# Patient Record
Sex: FEMALE | Race: WHITE | NOT HISPANIC OR LATINO | Employment: UNEMPLOYED | URBAN - NONMETROPOLITAN AREA
[De-identification: names, ages, dates, MRNs, and addresses within clinical notes are randomized per-mention and may not be internally consistent; named-entity substitution may affect disease eponyms.]

---

## 2017-02-08 ENCOUNTER — OFFICE VISIT (OUTPATIENT)
Dept: GASTROENTEROLOGY | Facility: CLINIC | Age: 52
End: 2017-02-08

## 2017-02-08 VITALS
DIASTOLIC BLOOD PRESSURE: 95 MMHG | TEMPERATURE: 98.4 F | BODY MASS INDEX: 36.7 KG/M2 | HEIGHT: 64 IN | WEIGHT: 215 LBS | RESPIRATION RATE: 15 BRPM | HEART RATE: 97 BPM | SYSTOLIC BLOOD PRESSURE: 146 MMHG

## 2017-02-08 DIAGNOSIS — K62.5 BRIGHT RED BLOOD PER RECTUM: ICD-10-CM

## 2017-02-08 DIAGNOSIS — R13.14 PHARYNGOESOPHAGEAL DYSPHAGIA: Chronic | ICD-10-CM

## 2017-02-08 DIAGNOSIS — R12 HEARTBURN: Primary | Chronic | ICD-10-CM

## 2017-02-08 DIAGNOSIS — K59.00 CONSTIPATION, UNSPECIFIED CONSTIPATION TYPE: ICD-10-CM

## 2017-02-08 DIAGNOSIS — R10.32 LEFT LOWER QUADRANT PAIN: ICD-10-CM

## 2017-02-08 DIAGNOSIS — R19.7 DIARRHEA, UNSPECIFIED TYPE: Chronic | ICD-10-CM

## 2017-02-08 PROCEDURE — 99214 OFFICE O/P EST MOD 30 MIN: CPT | Performed by: NURSE PRACTITIONER

## 2017-02-08 RX ORDER — ROPINIROLE 0.25 MG/1
0.25 TABLET, FILM COATED ORAL NIGHTLY
COMMUNITY

## 2017-02-08 NOTE — PATIENT INSTRUCTIONS
1. Antireflux measures: Avoid fried, fatty foods, alcohol, chocolate, coffee, tea,  soft drinks, peppermint and spearmint, spicy foods, tomatoes and tomato based foods, onion based foods, and smoking. Other antireflux measures include weight reduction if overweight, avoiding tight clothing around the abdomen, elevating the head of the bed 6 inches with blocks under the head board, and don't drink or eat before going to bed and avoid lying down immediately after meals.  2. Continue Omeprazole 40 mg 1 po daily in the am 30 minutes before breakfast.  3. Recommended to take Levothyroxine first in the am, wait 30 minutes, take Omeprazole, wait 30 minutes, then eat breakfast and take other medications.  4. Continue Welchol tablets for diarrhea. Avoid constipation.  5. Follow up colonoscopy in 5 years.  6. Patient declines EGD at this time.  7. Follow up: The patient wants to call back

## 2017-02-08 NOTE — PROGRESS NOTES
"[address] [place] [address] [address] [place] [address]    (H) [phone]  (W)     Chief Complaint   Patient presents with   • Follow-up     The patient is here for follow up. She states she is feeling much better after completing treatment for possible diverticulitis.The patient states she completed Cipro and Flagyl and her left lower quadrant abdominal pain resolved. She states she has not had any further pain.     There is a long-standing history of diarrhea. There is a history of cholecystectomy in the past. She has been taking Welchol tablets and this controls her symptoms. She states she may have 1-2 soft stools daily. She denies bright red blood per rectum or melena. The rectal bleeding resolved after completing antibiotics.    The patient states her heartburn is well controlled with Omeprazole 40 mg daily. She denies breakthrough symptoms. There is no nausea or vomiting. The patient states she has intermittent dysphagia symptoms, but this is well controlled if she chews her food well. Patient had been scheduled for EGD in November 2016, but cancelled as she was \"feeling better\".    Abdominal Pain   This is a recurrent problem. The current episode started more than 1 month ago. Episode frequency: 1 episode. The problem has been resolved. The pain is located in the LLQ. Associated symptoms include arthralgias, diarrhea and hematochezia. Pertinent negatives include no constipation, dysuria, fever, headaches, hematuria, melena, myalgias, nausea, vomiting or weight loss. She has tried antibiotics for the symptoms. The treatment provided significant relief. Prior diagnostic workup includes lower endoscopy. Her past medical history is significant for GERD.   Diarrhea    This is a chronic problem. The current episode started more than 1 year ago (starting at age 17, worsened in 2006 after gallbladder removal). The problem occurs less than 2 times per day. The problem has been unchanged. Diarrhea characteristics: soft. The patient states " that diarrhea does not awaken her from sleep. Associated symptoms include arthralgias. Pertinent negatives include no abdominal pain, chills, coughing, fever, headaches, myalgias, vomiting or weight loss. Nothing aggravates the symptoms. There are no known risk factors. Treatments tried: Welchol. The treatment provided significant relief.   Constipation   This is a recurrent problem. The current episode started more than 1 month ago (September 26, 2016). The problem has been resolved since onset. Associated symptoms include diarrhea and hematochezia. Pertinent negatives include no abdominal pain, fever, melena, nausea, vomiting or weight loss.   Rectal Bleeding   This is a recurrent problem. The current episode started more than 1 month ago (9/26/2016). The problem has been resolved. Associated symptoms include arthralgias, fatigue and joint swelling. Pertinent negatives include no abdominal pain, chest pain, chills, coughing, fever, headaches, myalgias, nausea, rash, vertigo or vomiting. Treatments tried: antibiotics. The treatment provided moderate relief.   Heartburn   She complains of heartburn. She reports no abdominal pain, no chest pain, no coughing or no nausea. This is a chronic problem. The current episode started more than 1 year ago. The problem occurs occasionally. The problem has been unchanged. The heartburn duration is an hour. The heartburn is located in the substernum. The heartburn is of mild intensity. The heartburn does not wake her from sleep. The heartburn does not limit her activity. The heartburn doesn't change with position. Nothing aggravates the symptoms. Associated symptoms include fatigue. Pertinent negatives include no anemia, melena or weight loss. There are no known risk factors. She has tried a PPI for the symptoms. The treatment provided significant relief. Past procedures include an EGD (2012 in [place]).   Difficulty Swallowing   This is a recurrent problem. The current episode  started more than 1 year ago (over 2 years). The problem occurs intermittently. The problem has been unchanged. Associated symptoms include arthralgias, fatigue and joint swelling. Pertinent negatives include no abdominal pain, chest pain, chills, coughing, fever, headaches, myalgias, nausea, rash, vertigo or vomiting. The symptoms are aggravated by eating. She has tried nothing for the symptoms.     Review of Systems   Constitutional: Positive for fatigue. Negative for appetite change, chills, fever, unexpected weight change and weight loss.   HENT: Negative for mouth sores, nosebleeds and trouble swallowing.    Eyes: Negative for discharge and redness.   Respiratory: Negative for apnea, cough and shortness of breath.    Cardiovascular: Negative for chest pain, palpitations and leg swelling.   Gastrointestinal: Positive for diarrhea, heartburn and hematochezia. Negative for abdominal distention, abdominal pain, anal bleeding, blood in stool, constipation, melena, nausea and vomiting.   Endocrine: Positive for cold intolerance and heat intolerance. Negative for polydipsia.   Genitourinary: Negative for dysuria, hematuria and urgency.   Musculoskeletal: Positive for arthralgias and joint swelling. Negative for myalgias.   Skin: Negative for rash.   Allergic/Immunologic: Negative for food allergies and immunocompromised state.   Neurological: Negative for dizziness, vertigo, seizures, syncope and headaches.   Hematological: Negative for adenopathy. Does not bruise/bleed easily.   Psychiatric/Behavioral: Negative for dysphoric mood. The patient is nervous/anxious. The patient is not hyperactive.      Patient Active Problem List   Diagnosis   • Migraine   • History of anxiety disorder   • History of depression   • History of hypertension   • Lower abdominal pain   • Constipation   • Diarrhea   • Bright red blood per rectum   • Heartburn   • Pharyngoesophageal dysphagia   • Left lower quadrant pain     Past Medical  History   Diagnosis Date   • Anxiety    • Asthma    • Back pain    • Colon polyp 2016   • Depression    • Diabetes mellitus    • Fibromyalgia    • Hypertension    • Hypothyroidism    • Migraine    • Osteoarthritis    • Sinus problem    • Snores      Past Surgical History   Procedure Laterality Date   • Cholecystectomy  2006     gallstones   •  section  ,    • Hysterectomy  2008     Partial hysty   • Ovary surgery       removal of ovary   • Colonoscopy     • Colonoscopy     • Colonoscopy  2016   • Upper gastrointestinal endoscopy     • Upper gastrointestinal endoscopy       Family History   Problem Relation Age of Onset   • Hypertension Mother    • Stroke Mother    • Mental illness Mother    • Diabetes Father    • Hypertension Father    • Cancer Other    • Stomach cancer Maternal Grandmother    • Colon cancer Neg Hx    • Ulcerative colitis Neg Hx    • Esophageal cancer Neg Hx    • Liver cancer Neg Hx    • Liver disease Neg Hx      Social History   Substance Use Topics   • Smoking status: Never Smoker   • Smokeless tobacco: Never Used   • Alcohol use No      Comment: Former use; quit in        Current Outpatient Prescriptions:   •  amitriptyline (ELAVIL) 50 MG tablet, Take 1 tablet by mouth every night., Disp: 30 tablet, Rfl: 11  •  buPROPion XL (WELLBUTRIN XL) 150 MG 24 hr tablet, Take 3 tabs every day, Disp: , Rfl: 1  •  cholecalciferol (VITAMIN D3) 35308 UNITS capsule, Take 50,000 Units by mouth 1 (One) Time Per Week., Disp: , Rfl:   •  dicyclomine (BENTYL) 10 MG capsule, TK 1 C PO TID, Disp: , Rfl: 5  •  DULoxetine (CYMBALTA) 60 MG capsule, Take 60 mg by mouth daily., Disp: , Rfl:   •  glipiZIDE (GLUCOTROL) 5 MG ER tablet, TK 1 T PO QD WITH KEN, Disp: , Rfl: 3  •  ibuprofen (ADVIL,MOTRIN) 600 MG tablet, Take 600 mg by mouth every 8 (eight) hours as needed., Disp: , Rfl:   •  levothyroxine (SYNTHROID, LEVOTHROID) 88  "MCG tablet, Take 88 mcg by mouth daily., Disp: , Rfl:   •  lisinopril-hydrochlorothiazide (PRINZIDE,ZESTORETIC) 20-12.5 MG per tablet, Take 1 tablet by mouth daily., Disp: , Rfl:   •  metFORMIN (GLUCOPHAGE) 500 MG tablet, Take 500 mg by mouth 2 (two) times a day., Disp: , Rfl:   •  montelukast (SINGULAIR) 10 MG tablet, Take 10 mg by mouth daily., Disp: , Rfl:   •  omeprazole (PriLOSEC) 40 MG capsule, TK ONE C PO QD BEFORE A MEAL, Disp: , Rfl: 1  •  rOPINIRole (REQUIP) 0.25 MG tablet, Take 0.25 mg by mouth Every Night. Take 1 hour before bedtime., Disp: , Rfl:   •  traMADol (ULTRAM) 50 MG tablet, TK 1 T PO HS, Disp: , Rfl: 1  •  WELCHOL 625 MG tablet, TK 1 T PO TID WITH A MEAL AND LIQUID, Disp: , Rfl: 5  •  Emollient (CERAVE) lotion, USE UTD ON PACKAGE, Disp: , Rfl: 0     Allergies   Allergen Reactions   • Ceftin [Cefuroxime] Hives   • Erythromycin Diarrhea and GI Intolerance   • Keflex [Cephalexin] Hives     Visit Vitals   • /95   • Pulse 97   • Temp 98.4 °F (36.9 °C)   • Resp 15   • Ht 64\" (162.6 cm)   • Wt 215 lb (97.5 kg)   • LMP 10/11/2008 (Approximate)   • BMI 36.9 kg/m2     Physical Exam   Constitutional: She is oriented to person, place, and time. She appears well-developed and well-nourished. No distress.   HENT:   Head: Normocephalic and atraumatic.   Right Ear: Hearing and external ear normal.   Left Ear: Hearing and external ear normal.   Nose: Nose normal.   Mouth/Throat: Oropharynx is clear and moist and mucous membranes are normal. Mucous membranes are not pale, not dry and not cyanotic. No oral lesions. No oropharyngeal exudate.   Eyes: Conjunctivae and EOM are normal. Right eye exhibits no discharge. Left eye exhibits no discharge.   Neck: Trachea normal. Neck supple. No JVD present. No edema present. No thyroid mass and no thyromegaly present.   Cardiovascular: Normal rate, regular rhythm, S2 normal and normal heart sounds.  Exam reveals no gallop, no S3 and no friction rub.    No murmur " heard.  Pulmonary/Chest: Effort normal and breath sounds normal. No respiratory distress. She exhibits no tenderness.   Abdominal: Normal appearance and bowel sounds are normal. She exhibits no distension, no ascites and no mass. There is no splenomegaly or hepatomegaly. There is no tenderness. There is no rigidity, no rebound and no guarding. No hernia.       Vascular Status -  Her exam exhibits no right foot edema. Her exam exhibits no left foot edema.  Lymphadenopathy:     She has no cervical adenopathy.        Left: No supraclavicular adenopathy present.   Neurological: She is alert and oriented to person, place, and time. She has normal strength. No cranial nerve deficit or sensory deficit.   Skin: No rash noted. She is not diaphoretic. No cyanosis. No pallor. Nails show no clubbing.   Psychiatric: She has a normal mood and affect.     Laboratory Tests:    Upon review of records:     Dated 4/28/2016 glucose 156 potassium 4.2 sodium 134 BUN 15 creatinine 0.79 albumin 4. 8:00 phosphatase 95 ALT 35 AST 24 total bilirubin 0.5 calcium 10.1 CO2 27 chloride 96 WBC 9.9 hemoglobin 14.3 hematocrit 43.5 platelet count 221 MCV 88.1 TSH 1.92 hemoglobin A1c 8.8    Procedures:    Colonoscopy dated 11/16/2016 reveals scant diverticular change in the left colon.  Small diminutive colon polyps.  Internal and small external hemorrhoids.  No endoscopic evidence of ileitis or colitis was seen.  Random biopsies were obtained from the colon upon withdrawal of the scope.  Small bowel terminal ileum biopsy reveals preserved villous architecture with no significant histopathologic change.  No parasites noted.  No villous blunting or increased intraepithelial lymphocytes suggestive of celiac disease.  Random colon biopsies revealed colonic mucosa with no significant histopathologic change.  No histologic features of acute colitis, chronic colitis, collagenous colitis or lymphocytic colitis identified.  Rectal polyp biopsy reveals  hyperplastic polyp.    Meryl was seen today for follow-up.    Diagnoses and all orders for this visit:    Heartburn  Comments:  Controlled with Omeprazole daily.    Pharyngoesophageal dysphagia  Comments:  Differentials include Schatzki's ring, esophageal dysmotility, esophagitis.    Diarrhea, unspecified type  Comments:  Likely secondary to bile acid diarrhea.    Left lower quadrant pain  Comments:  Resolved.    Constipation, unspecified constipation type  Comments:  Resolved.    Bright red blood per rectum  Comments:  Resolved. Likely secondary to internal hemorrhoids.           Plan   Patient Instructions   1. Antireflux measures: Avoid fried, fatty foods, alcohol, chocolate, coffee, tea,  soft drinks, peppermint and spearmint, spicy foods, tomatoes and tomato based foods, onion based foods, and smoking. Other antireflux measures include weight reduction if overweight, avoiding tight clothing around the abdomen, elevating the head of the bed 6 inches with blocks under the head board, and don't drink or eat before going to bed and avoid lying down immediately after meals.  2. Continue Omeprazole 40 mg 1 po daily in the am 30 minutes before breakfast.  3. Recommended to take Levothyroxine first in the am, wait 30 minutes, take Omeprazole, wait 30 minutes, then eat breakfast and take other medications.  4. Continue Welchol tablets for diarrhea. Avoid constipation.  5. Follow up colonoscopy in 5 years.  6. Patient declines EGD at this time.  7. Follow up: The patient wants to call back    Patient Care Team:  NORY Forte as PCP - General    NORY Millan

## 2017-07-18 ENCOUNTER — OFFICE VISIT (OUTPATIENT)
Dept: NEUROLOGY | Facility: CLINIC | Age: 52
End: 2017-07-18

## 2017-07-18 VITALS
BODY MASS INDEX: 36.37 KG/M2 | HEIGHT: 64 IN | DIASTOLIC BLOOD PRESSURE: 76 MMHG | HEART RATE: 96 BPM | SYSTOLIC BLOOD PRESSURE: 118 MMHG | WEIGHT: 213 LBS

## 2017-07-18 DIAGNOSIS — G43.009 MIGRAINE WITHOUT AURA AND WITHOUT STATUS MIGRAINOSUS, NOT INTRACTABLE: Primary | ICD-10-CM

## 2017-07-18 PROCEDURE — 99212 OFFICE O/P EST SF 10 MIN: CPT | Performed by: PSYCHIATRY & NEUROLOGY

## 2017-07-18 RX ORDER — AMITRIPTYLINE HYDROCHLORIDE 50 MG/1
50 TABLET, FILM COATED ORAL NIGHTLY
Qty: 30 TABLET | Refills: 11 | Status: SHIPPED | OUTPATIENT
Start: 2017-07-18 | End: 2018-07-18 | Stop reason: SDUPTHER

## 2017-07-18 NOTE — PROGRESS NOTES
"Subjective:     Patient ID: Meryl Perla is a 51 y.o. female.    History of Present Illness     51 y.o.  woman with migraines returns in follow up.  Last visit on 7/19/16 continued Elavil 50 mg qhs.    HA frequency a couple times a month.  Sensitive to light, and noise, feels overstimulated.  Top of head pressure and vibrations with rare throbbing.  Moderate to severe intensity.      The following portions of the patient's history were reviewed and updated as appropriate: allergies, current medications, past medical history, past surgical history and problem list.    Review of Systems   Constitutional: Negative for activity change, fatigue and unexpected weight change.   HENT: Negative for tinnitus and trouble swallowing.    Eyes: Negative for photophobia and visual disturbance.   Respiratory: Negative for apnea, cough and choking.    Cardiovascular: Negative for leg swelling.   Gastrointestinal: Negative for nausea and vomiting.   Endocrine: Negative for cold intolerance and heat intolerance.   Genitourinary: Negative for difficulty urinating, frequency, menstrual problem and urgency.   Musculoskeletal: Negative for back pain, gait problem, myalgias and neck pain.   Skin: Negative for color change and rash.   Allergic/Immunologic: Negative for immunocompromised state.   Neurological: Positive for headaches. Negative for dizziness, tremors, seizures, syncope, facial asymmetry, speech difficulty, weakness, light-headedness and numbness.   Hematological: Negative for adenopathy. Does not bruise/bleed easily.   Psychiatric/Behavioral: Negative for behavioral problems, confusion, decreased concentration, hallucinations and sleep disturbance.        Objective:  Vitals:    07/18/17 1300   BP: 118/76   Pulse: 96   Weight: 213 lb (96.6 kg)   Height: 64\" (162.6 cm)       Neurologic Exam     Mental Status   Attention: normal. Concentration: normal.   Level of consciousness: alert  Knowledge: good and consistent with " education.   Normal comprehension.     Cranial Nerves     CN II   Visual fields full to confrontation.   Visual acuity: normal  Right visual field deficit: none  Left visual field deficit: none     CN III, IV, VI   Nystagmus: none   Diplopia: none  Ophthalmoparesis: none  Upgaze: normal  Downgaze: normal  Conjugate gaze: present    CN V   Facial sensation intact.   Right corneal reflex: normal  Left corneal reflex: normal    CN VII   Right facial weakness: none  Left facial weakness: none    CN VIII   Hearing: intact    CN IX, X   Palate: symmetric  Right gag reflex: normal  Left gag reflex: normal    CN XI   Right sternocleidomastoid strength: normal  Left sternocleidomastoid strength: normal    CN XII   Tongue: not atrophic  Fasciculations: absent  Tongue deviation: none    Motor Exam   Muscle bulk: normal  Overall muscle tone: normal  Right arm tone: normal  Left arm tone: normal  Right leg tone: normal  Left leg tone: normal    Sensory Exam   Light touch normal.     Gait, Coordination, and Reflexes     Tremor   Resting tremor: absent  Intention tremor: absent  Action tremor: absent    Reflexes   Reflexes 2+ except as noted.       Physical Exam   Constitutional: She appears well-developed and well-nourished.   Nursing note and vitals reviewed.      Assessment/Plan:       Problems Addressed this Visit        Cardiovascular and Mediastinum    Migraine - Primary     Headaches are unchanged.  Continue current treatment regimen.     Elavil 50 mg qhs             Relevant Medications    amitriptyline (ELAVIL) 50 MG tablet

## 2018-07-18 ENCOUNTER — OFFICE VISIT (OUTPATIENT)
Dept: NEUROLOGY | Facility: CLINIC | Age: 53
End: 2018-07-18

## 2018-07-18 VITALS
WEIGHT: 208 LBS | OXYGEN SATURATION: 99 % | HEIGHT: 64 IN | SYSTOLIC BLOOD PRESSURE: 116 MMHG | RESPIRATION RATE: 18 BRPM | BODY MASS INDEX: 35.51 KG/M2 | HEART RATE: 97 BPM | DIASTOLIC BLOOD PRESSURE: 78 MMHG

## 2018-07-18 DIAGNOSIS — G43.009 MIGRAINE WITHOUT AURA AND WITHOUT STATUS MIGRAINOSUS, NOT INTRACTABLE: Primary | ICD-10-CM

## 2018-07-18 DIAGNOSIS — G25.81 RESTLESS LEGS SYNDROME (RLS): ICD-10-CM

## 2018-07-18 PROCEDURE — 99213 OFFICE O/P EST LOW 20 MIN: CPT | Performed by: PSYCHIATRY & NEUROLOGY

## 2018-07-18 RX ORDER — ARIPIPRAZOLE 5 MG/1
5 TABLET ORAL DAILY
COMMUNITY

## 2018-07-18 RX ORDER — AMITRIPTYLINE HYDROCHLORIDE 50 MG/1
50 TABLET, FILM COATED ORAL NIGHTLY
Qty: 30 TABLET | Refills: 11 | Status: SHIPPED | OUTPATIENT
Start: 2018-07-18 | End: 2019-10-22 | Stop reason: SDUPTHER

## 2018-07-18 NOTE — PROGRESS NOTES
"Subjective:     Patient ID: Meryl Perla is a 52 y.o. female.  Chief Complaint   Patient presents with   • Migraine       History of Present Illness     52 y.o.  woman with migraines and RLS returns in follow up.  Last visit on 7/18/17 continued Elavil 50 mg qhs.    Migraines     HA frequency increased to twice a week.  Sharp shooting pains lasting for a few seconds.   Sensitive to light, and noise, feels overstimulated.  Top of head pressure and vibrations with rare throbbing.  Moderate to severe intensity.    Tolerating Elavil, Cymbalta.     RLS    Controlled on Requip 0.75 mg qhs     The following portions of the patient's history were reviewed and updated as appropriate: allergies, current medications, past medical history, past surgical history and problem list.    Review of Systems   Constitutional: Negative for activity change, fatigue and unexpected weight change.   HENT: Negative for trouble swallowing.    Eyes: Negative for visual disturbance.   Respiratory: Negative for apnea and choking.    Cardiovascular: Negative for leg swelling.   Endocrine: Negative for cold intolerance and heat intolerance.   Genitourinary: Negative for difficulty urinating, frequency, menstrual problem and urgency.   Musculoskeletal: Negative for gait problem and myalgias.   Skin: Negative for color change and rash.   Allergic/Immunologic: Negative for immunocompromised state.   Neurological: Positive for headaches. Negative for tremors, syncope, facial asymmetry, speech difficulty and light-headedness.   Hematological: Negative for adenopathy. Does not bruise/bleed easily.   Psychiatric/Behavioral: Negative for behavioral problems, confusion, decreased concentration, hallucinations and sleep disturbance.        Objective:  Vitals:    07/18/18 1325   BP: 116/78   BP Location: Right arm   Patient Position: Sitting   Cuff Size: Adult   Pulse: 97   Resp: 18   SpO2: 99%   Weight: 94.3 kg (208 lb)   Height: 162.6 cm (64\") "       Neurologic Exam     Mental Status   Attention: normal. Concentration: normal.   Level of consciousness: alert  Knowledge: good and consistent with education.   Normal comprehension.     Cranial Nerves     CN II   Visual fields full to confrontation.   Visual acuity: normal  Right visual field deficit: none  Left visual field deficit: none     CN III, IV, VI   Nystagmus: none   Diplopia: none  Ophthalmoparesis: none  Upgaze: normal  Downgaze: normal  Conjugate gaze: present    CN V   Facial sensation intact.   Right corneal reflex: normal  Left corneal reflex: normal    CN VII   Right facial weakness: none  Left facial weakness: none    CN VIII   Hearing: intact    CN IX, X   Palate: symmetric  Right gag reflex: normal  Left gag reflex: normal    CN XI   Right sternocleidomastoid strength: normal  Left sternocleidomastoid strength: normal    CN XII   Tongue: not atrophic  Fasciculations: absent  Tongue deviation: none    Motor Exam   Muscle bulk: normal  Overall muscle tone: normal  Right arm tone: normal  Left arm tone: normal  Right leg tone: normal  Left leg tone: normal    Sensory Exam   Light touch normal.     Gait, Coordination, and Reflexes     Tremor   Resting tremor: absent  Intention tremor: absent  Action tremor: absent    Reflexes   Reflexes 2+ except as noted.       Physical Exam   Constitutional: She appears well-developed and well-nourished.   Nursing note and vitals reviewed.      Assessment/Plan:       Problems Addressed this Visit        Cardiovascular and Mediastinum    Migraine - Primary     Headaches are unchanged.  Continue current treatment regimen.             Relevant Medications    amitriptyline (ELAVIL) 50 MG tablet       Other    Restless legs syndrome (RLS)     Controlled on Requip

## 2019-10-22 RX ORDER — AMITRIPTYLINE HYDROCHLORIDE 50 MG/1
50 TABLET, FILM COATED ORAL NIGHTLY
Qty: 30 TABLET | Refills: 0 | Status: SHIPPED | OUTPATIENT
Start: 2019-10-22 | End: 2019-11-21 | Stop reason: SDUPTHER

## 2019-10-22 NOTE — TELEPHONE ENCOUNTER
Called pt and left vm for pt to please give office a call to schedule fu appt so that we may continue refilling medications. Thanks.

## 2019-11-21 RX ORDER — AMITRIPTYLINE HYDROCHLORIDE 50 MG/1
50 TABLET, FILM COATED ORAL NIGHTLY
Qty: 30 TABLET | Refills: 11 | Status: SHIPPED | OUTPATIENT
Start: 2019-11-21 | End: 2020-11-20

## 2021-12-02 ENCOUNTER — PRIOR AUTHORIZATION (OUTPATIENT)
Dept: ENDOCRINOLOGY | Age: 56
End: 2021-12-02

## 2023-06-13 ENCOUNTER — LAB (OUTPATIENT)
Dept: LAB | Facility: HOSPITAL | Age: 58
End: 2023-06-13
Payer: COMMERCIAL

## 2023-06-13 ENCOUNTER — OFFICE VISIT (OUTPATIENT)
Dept: GASTROENTEROLOGY | Facility: CLINIC | Age: 58
End: 2023-06-13
Payer: COMMERCIAL

## 2023-06-13 VITALS
TEMPERATURE: 98.1 F | SYSTOLIC BLOOD PRESSURE: 112 MMHG | HEIGHT: 64 IN | BODY MASS INDEX: 31.92 KG/M2 | HEART RATE: 87 BPM | WEIGHT: 187 LBS | OXYGEN SATURATION: 100 % | DIASTOLIC BLOOD PRESSURE: 78 MMHG

## 2023-06-13 DIAGNOSIS — K21.9 GASTROESOPHAGEAL REFLUX DISEASE, UNSPECIFIED WHETHER ESOPHAGITIS PRESENT: Chronic | ICD-10-CM

## 2023-06-13 DIAGNOSIS — R19.7 DIARRHEA, UNSPECIFIED TYPE: Chronic | ICD-10-CM

## 2023-06-13 DIAGNOSIS — D64.9 ANEMIA, UNSPECIFIED TYPE: ICD-10-CM

## 2023-06-13 DIAGNOSIS — K76.0 FATTY (CHANGE OF) LIVER, NOT ELSEWHERE CLASSIFIED: ICD-10-CM

## 2023-06-13 DIAGNOSIS — K76.0 FATTY (CHANGE OF) LIVER, NOT ELSEWHERE CLASSIFIED: Chronic | ICD-10-CM

## 2023-06-13 DIAGNOSIS — Z86.010 PERSONAL HISTORY OF COLONIC POLYPS: Chronic | ICD-10-CM

## 2023-06-13 DIAGNOSIS — D64.9 ANEMIA, UNSPECIFIED TYPE: Chronic | ICD-10-CM

## 2023-06-13 DIAGNOSIS — R79.89 ELEVATED LIVER FUNCTION TESTS: Primary | Chronic | ICD-10-CM

## 2023-06-13 DIAGNOSIS — E66.09 CLASS 1 OBESITY DUE TO EXCESS CALORIES WITH SERIOUS COMORBIDITY AND BODY MASS INDEX (BMI) OF 32.0 TO 32.9 IN ADULT: Chronic | ICD-10-CM

## 2023-06-13 DIAGNOSIS — R10.30 LOWER ABDOMINAL PAIN: Chronic | ICD-10-CM

## 2023-06-13 DIAGNOSIS — R79.89 ELEVATED LIVER FUNCTION TESTS: ICD-10-CM

## 2023-06-13 PROBLEM — E66.811 CLASS 1 OBESITY DUE TO EXCESS CALORIES WITH SERIOUS COMORBIDITY AND BODY MASS INDEX (BMI) OF 32.0 TO 32.9 IN ADULT: Status: ACTIVE | Noted: 2023-06-13

## 2023-06-13 PROBLEM — Z86.0100 PERSONAL HISTORY OF COLONIC POLYPS: Status: ACTIVE | Noted: 2023-06-13

## 2023-06-13 LAB
ALBUMIN SERPL-MCNC: 4.8 G/DL (ref 3.5–5.2)
ALBUMIN/GLOB SERPL: 1.7 G/DL
ALP SERPL-CCNC: 69 U/L (ref 39–117)
ALT SERPL W P-5'-P-CCNC: 45 U/L (ref 1–33)
ANION GAP SERPL CALCULATED.3IONS-SCNC: 13 MMOL/L (ref 5–15)
AST SERPL-CCNC: 36 U/L (ref 1–32)
BILIRUB SERPL-MCNC: 0.4 MG/DL (ref 0–1.2)
BUN SERPL-MCNC: 16 MG/DL (ref 6–20)
BUN/CREAT SERPL: 14.2 (ref 7–25)
CALCIUM SPEC-SCNC: 10.1 MG/DL (ref 8.6–10.5)
CERULOPLASMIN SERPL-MCNC: 25 MG/DL (ref 19–39)
CHLORIDE SERPL-SCNC: 103 MMOL/L (ref 98–107)
CO2 SERPL-SCNC: 23 MMOL/L (ref 22–29)
CREAT SERPL-MCNC: 1.13 MG/DL (ref 0.57–1)
DEPRECATED RDW RBC AUTO: 38.9 FL (ref 37–54)
EGFRCR SERPLBLD CKD-EPI 2021: 56.9 ML/MIN/1.73
ERYTHROCYTE [DISTWIDTH] IN BLOOD BY AUTOMATED COUNT: 12.2 % (ref 12.3–15.4)
FERRITIN SERPL-MCNC: 82 NG/ML (ref 13–150)
GLOBULIN UR ELPH-MCNC: 2.8 GM/DL
GLUCOSE SERPL-MCNC: 180 MG/DL (ref 65–99)
HBV SURFACE AG SERPL QL IA: NORMAL
HCT VFR BLD AUTO: 40.2 % (ref 34–46.6)
HCV AB SER DONR QL: NORMAL
HGB BLD-MCNC: 13.6 G/DL (ref 12–15.9)
INR PPP: 1.02 (ref 0.9–1.1)
IRON 24H UR-MRATE: 102 MCG/DL (ref 37–145)
IRON SATN MFR SERPL: 20 % (ref 20–50)
MCH RBC QN AUTO: 29.4 PG (ref 26.6–33)
MCHC RBC AUTO-ENTMCNC: 33.8 G/DL (ref 31.5–35.7)
MCV RBC AUTO: 87 FL (ref 79–97)
PLATELET # BLD AUTO: 141 10*3/MM3 (ref 140–450)
PMV BLD AUTO: 11.1 FL (ref 6–12)
POTASSIUM SERPL-SCNC: 4.4 MMOL/L (ref 3.5–5.2)
PROT SERPL-MCNC: 7.6 G/DL (ref 6–8.5)
PROTHROMBIN TIME: 13.9 SECONDS (ref 12.3–15.1)
RBC # BLD AUTO: 4.62 10*6/MM3 (ref 3.77–5.28)
SODIUM SERPL-SCNC: 139 MMOL/L (ref 136–145)
TIBC SERPL-MCNC: 520 MCG/DL (ref 298–536)
TRANSFERRIN SERPL-MCNC: 349 MG/DL (ref 200–360)
WBC NRBC COR # BLD: 6.91 10*3/MM3 (ref 3.4–10.8)

## 2023-06-13 PROCEDURE — 82977 ASSAY OF GGT: CPT

## 2023-06-13 PROCEDURE — 85610 PROTHROMBIN TIME: CPT

## 2023-06-13 PROCEDURE — 99204 OFFICE O/P NEW MOD 45 MIN: CPT | Performed by: NURSE PRACTITIONER

## 2023-06-13 PROCEDURE — 82390 ASSAY OF CERULOPLASMIN: CPT

## 2023-06-13 PROCEDURE — 82947 ASSAY GLUCOSE BLOOD QUANT: CPT

## 2023-06-13 PROCEDURE — 82247 BILIRUBIN TOTAL: CPT

## 2023-06-13 PROCEDURE — 86803 HEPATITIS C AB TEST: CPT

## 2023-06-13 PROCEDURE — 82465 ASSAY BLD/SERUM CHOLESTEROL: CPT

## 2023-06-13 PROCEDURE — 85027 COMPLETE CBC AUTOMATED: CPT

## 2023-06-13 PROCEDURE — 86704 HEP B CORE ANTIBODY TOTAL: CPT

## 2023-06-13 PROCEDURE — 36415 COLL VENOUS BLD VENIPUNCTURE: CPT

## 2023-06-13 PROCEDURE — 86015 ACTIN ANTIBODY EACH: CPT

## 2023-06-13 PROCEDURE — 86317 IMMUNOASSAY INFECTIOUS AGENT: CPT

## 2023-06-13 PROCEDURE — 87340 HEPATITIS B SURFACE AG IA: CPT

## 2023-06-13 PROCEDURE — 86381 MITOCHONDRIAL ANTIBODY EACH: CPT

## 2023-06-13 PROCEDURE — 83010 ASSAY OF HAPTOGLOBIN QUANT: CPT

## 2023-06-13 PROCEDURE — 83540 ASSAY OF IRON: CPT

## 2023-06-13 PROCEDURE — 84466 ASSAY OF TRANSFERRIN: CPT

## 2023-06-13 PROCEDURE — 86038 ANTINUCLEAR ANTIBODIES: CPT

## 2023-06-13 PROCEDURE — 83883 ASSAY NEPHELOMETRY NOT SPEC: CPT

## 2023-06-13 PROCEDURE — 82172 ASSAY OF APOLIPOPROTEIN: CPT

## 2023-06-13 PROCEDURE — 84478 ASSAY OF TRIGLYCERIDES: CPT

## 2023-06-13 PROCEDURE — 82728 ASSAY OF FERRITIN: CPT

## 2023-06-13 PROCEDURE — 86708 HEPATITIS A ANTIBODY: CPT

## 2023-06-13 PROCEDURE — 80053 COMPREHEN METABOLIC PANEL: CPT

## 2023-06-13 RX ORDER — PREGABALIN 100 MG/1
CAPSULE ORAL
COMMUNITY

## 2023-06-13 RX ORDER — DULAGLUTIDE 3 MG/.5ML
INJECTION, SOLUTION SUBCUTANEOUS
COMMUNITY
Start: 2023-06-09

## 2023-06-13 RX ORDER — SODIUM CHLORIDE 9 MG/ML
70 INJECTION, SOLUTION INTRAVENOUS CONTINUOUS PRN
OUTPATIENT
Start: 2023-06-13

## 2023-06-13 NOTE — PROGRESS NOTES
"     New Patient Consult      Date: 2023   Patient Name: Meryl Perla  MRN: [id number]  : 1965     Primary Care Provider: Isabela Sen APRN    Chief Complaint   Patient presents with    high LFT     History of Present Illness: Meryl Perla is a 57 y.o. female who is here today to establish care with gastroenterology for elevated liver enzymes.     She has a history of elevated liver enzymes for the past 5-6 years. She denies personal or family history of liver disease. There is no history of IVDA, alcohol use, tattoos or blood transfusions.     She has a history of anemia for the past year or so. There is no history of GI bleeding, denies hematemesis, melena or hematochezia. There is no history of hematuria or vaginal bleeding.     She has a history of reflux for many years that is reasonably controlled with Omeprazole 40 mg daily. No difficulty swallowing. Denies nausea or vomiting.     She has a history of \"bile acid diarrhea\" for many years that is reasonably controlled with Welchol tablet 1-3 times per day. She usually has 1-2 soft bowel movements per day, occasionally has 3. She has occasional abdominal pain associated with bowel movements if having diarrhea, maybe 1-2 times per week.     Her last colonoscopy was in 2022 by Dr. Montenegro in [place], [place]. Her last EGD was 7-8 years ago. Her grandmother had stomach cancer. No family history of colon cancer.    Subjective      Past Medical History:   Diagnosis Date    Anxiety 1982    Asthma 1993    Back pain 2013    Colon polyp 2016    Depression 1982    Diabetes mellitus 2015    Fibromyalgia     Hypertension     Hypothyroidism 2013    Migraine 2013    Osteoarthritis 2013    Sinus problem 1993    Snores      Past Surgical History:   Procedure Laterality Date     SECTION  ,     CHOLECYSTECTOMY  2006    gallstones    COLONOSCOPY      COLONOSCOPY      COLONOSCOPY  2016    HYSTERECTOMY  2008 "    Partial hysty    OVARY SURGERY  2010    removal of ovary    UPPER GASTROINTESTINAL ENDOSCOPY  2012    UPPER GASTROINTESTINAL ENDOSCOPY  2006     Family History   Problem Relation Age of Onset    Hypertension Mother     Stroke Mother     Mental illness Mother     Diabetes Father     Hypertension Father     Cancer Other     Stomach cancer Maternal Grandmother     Colon cancer Neg Hx     Ulcerative colitis Neg Hx     Esophageal cancer Neg Hx     Liver cancer Neg Hx     Liver disease Neg Hx      Social History     Socioeconomic History    Marital status:    Tobacco Use    Smoking status: Never    Smokeless tobacco: Never   Substance and Sexual Activity    Alcohol use: No     Comment: Former use; quit in 1989    Drug use: No    Sexual activity: Defer       Current Outpatient Medications:     ARIPiprazole (ABILIFY) 5 MG tablet, Take 1 tablet by mouth Daily., Disp: , Rfl:     buPROPion XL (WELLBUTRIN XL) 150 MG 24 hr tablet, Take 3 tabs every day, Disp: , Rfl: 1    cholecalciferol (VITAMIN D3) 24741 UNITS capsule, Take 1 capsule by mouth 1 (One) Time Per Week., Disp: , Rfl:     dicyclomine (BENTYL) 10 MG capsule, TK 1 C PO TID, Disp: , Rfl: 5    DULoxetine (CYMBALTA) 60 MG capsule, Take 1 capsule by mouth Daily., Disp: , Rfl:     Emollient (CERAVE) lotion, USE UTD ON PACKAGE, Disp: , Rfl: 0    glipiZIDE (GLUCOTROL) 5 MG ER tablet, TK 1 T PO QD WITH KEN, Disp: , Rfl: 3    ibuprofen (ADVIL,MOTRIN) 600 MG tablet, Take 1 tablet by mouth Every 8 (Eight) Hours As Needed., Disp: , Rfl:     levothyroxine (SYNTHROID, LEVOTHROID) 88 MCG tablet, Take 100 mcg by mouth Daily., Disp: , Rfl:     lisinopril-hydrochlorothiazide (PRINZIDE,ZESTORETIC) 20-12.5 MG per tablet, Take 1 tablet by mouth Daily., Disp: , Rfl:     metFORMIN (GLUCOPHAGE) 500 MG tablet, Take 2 tablets by mouth 2 (Two) Times a Day., Disp: , Rfl:     montelukast (SINGULAIR) 10 MG tablet, Take 1 tablet by mouth Daily., Disp: , Rfl:     omeprazole (PriLOSEC) 40 MG  capsule, TK ONE C PO QD BEFORE A MEAL, Disp: , Rfl: 1    pregabalin (LYRICA) 100 MG capsule, pregabalin 100 mg capsule  TAKE 1 CAPSULE BY MOUTH TWICE DAILY, Disp: , Rfl:     rOPINIRole (REQUIP) 0.25 MG tablet, Take 1 tablet by mouth Every Night. Take 1 hour before bedtime., Disp: , Rfl:     traMADol (ULTRAM) 50 MG tablet, TK 1 T PO HS, Disp: , Rfl: 1    Trulicity 3 MG/0.5ML solution pen-injector, , Disp: , Rfl:     WELCHOL 625 MG tablet, TK 1 T PO TID WITH A MEAL AND LIQUID, Disp: , Rfl: 5     Allergies   Allergen Reactions    Ceftin [Cefuroxime] Hives    Erythromycin Diarrhea and GI Intolerance    Keflex [Cephalexin] Hives     The following portions of the patient's history were reviewed and updated as appropriate: allergies, current medications, past family history, past medical history, past social history, past surgical history and problem list.    Objective     Physical Exam  Vitals and nursing note reviewed.   Constitutional:       General: She is not in acute distress.     Appearance: Normal appearance. She is well-developed.   HENT:      Head: Normocephalic and atraumatic.      Mouth/Throat:      Mouth: Mucous membranes are not pale, not dry and not cyanotic.   Eyes:      General: Lids are normal.   Neck:      Trachea: Trachea normal.   Cardiovascular:      Rate and Rhythm: Normal rate.   Pulmonary:      Effort: Pulmonary effort is normal. No respiratory distress.      Breath sounds: Normal breath sounds.   Abdominal:      General: Bowel sounds are normal.      Palpations: Abdomen is soft. There is no mass.      Tenderness: There is no abdominal tenderness.      Hernia: No hernia is present.   Skin:     General: Skin is warm and dry.   Neurological:      Mental Status: She is alert and oriented to person, place, and time.   Psychiatric:         Mood and Affect: Mood normal.         Speech: Speech normal.         Behavior: Behavior normal. Behavior is cooperative.       Vitals:    06/13/23 0904   BP: 112/78  "  BP Location: Left arm   Patient Position: Sitting   Cuff Size: Adult   Pulse: 87   Temp: 98.1 °F (36.7 °C)   SpO2: 100%   Weight: 84.8 kg (187 lb)   Height: 162.6 cm (64\")     Body mass index is 32.1 kg/m².     Results Review:   I have reviewed the patient's new clinical and imaging results.    No visits with results within 90 Day(s) from this visit.   Latest known visit with results is:   No results found for any previous visit.      Dated 4/7/2022 albumin 4.6 alkaline phosphatase 93 ALT 36 AST 46 total bilirubin <0.2 hemoglobin 10.4 hematocrit 34.0 platelet count 250, TSH 2.630, triglycerides 190, total cholesterol 177    Abdominal ultrasound limited 2/2/2023  Fatty infiltration of the liver.   The gallbladder is not identified.     Colonoscopy dated 11/16/2016 per Dr. Grimaldo  - Scant diverticular change in the left colon.    - Small diminutive colon polyps.    - Internal and small external hemorrhoids.    - No endoscopic evidence of ileitis or colitis was seen.  Random biopsies were obtained from the colon upon withdrawal of the scope.    - Small bowel terminal ileum biopsy reveals preserved villous architecture with no significant histopathologic change.  No parasites noted.  No villous blunting or increased intraepithelial lymphocytes suggestive of celiac disease.  Random colon biopsies revealed colonic mucosa with no significant histopathologic change.  No histologic features of acute colitis, chronic colitis, collagenous colitis or lymphocytic colitis identified.  Rectal polyp biopsy reveals hyperplastic polyp.    Colonoscopy 8/31/2022 per Dr. Reyna Montenegro, surgical services  - The ileocecal valve and appendiceal orifice were identified. There was no   Sign whatsoever of any polyps, tumors, or inflammatory bowel disease. I did not   See any diverticula. We did do random biopsies of the right and left colon for  microscopic colitis as she had noted that her diarrhea was increasing. She   As well had two " internal hemorrhoids. The patient tolerated the procedure well  and was returned to the recovery room in good condition.   -Pathology results not available.    Assessment / Plan      1. Elevated liver function tests  2. Fatty (change of) liver, not elsewhere classified  3. Class 1 obesity due to excess calories with serious comorbidity and body mass index (BMI) of 32.0 to 32.9 in adult  BMI 32.1  She has a history of elevated liver enzymes for the past 5 to 6 years.  No personal or family history of liver disease.  There is no history of IVDA, alcohol use, tattoos or blood transfusions.  Labs in April 2022 with mild elevation of AST and ALT, normal alkaline phosphatase and total bilirubin.  Abdominal ultrasound with fatty liver.  Advise low-fat diet, exercise and weight reduction.  Labs    - CBC (No Diff); Future  - Comprehensive Metabolic Panel; Future  - Hepatitis A Antibody, Total; Future  - Hepatitis B Surf Antibody Quant; Future  - Hepatitis B Surface Antigen; Future  - Hepatitis B Core Antibody, Total; Future  - Hepatitis C Antibody; Future  - Protime-INR; Future  - Iron Profile; Future  - Ferritin; Future  - BETTINA; Future  - Anti-Smooth Muscle Antibody Titer; Future  - Mitochondrial Antibodies, M2; Future  - Ceruloplasmin; Future  - GREER Fibrosure Plus; Future    4. Anemia, unspecified type  Upon review of records, she has a history of anemia for the past year or so.  Labs in April 2022 with hemoglobin 10.4, hematocrit 34.0. Patient denies any history of GI bleeding, no hematemesis, melena or hematochezia.  There is no history of hematuria or vaginal bleeding.  Colonoscopy dated 8/31/2022 per Dr. Montenegro unremarkable.  Her last EGD was 7 to 8 years ago.  Results unknown.  Her grandmother had stomach cancer.  EGD to rule out upper GI source of anemia.    - Iron Profile; Future  - Ferritin; Future  - Case Request    5. Gastroesophageal reflux disease, unspecified whether esophagitis present  She has a history of  reflux for many years that is reasonably controlled with omeprazole 40 mg daily.  Continue same for now.  No difficulty swallowing.  Antireflux measures.    - Case Request    6. Diarrhea, unspecified type  7. Lower abdominal pain  She has a history of diarrhea with lower abdominal pain for many years that is unchanged.  She is taking WelChol tablets 1-3 times per day with reasonable control, she usually has 1-2 soft bowel movements per day.  There is no history of rectal bleeding.  TSH normal.  Colonoscopy dated 8/31/2022 unremarkable, pathology results unavailable for random colon biopsies.  High-fiber, low-fat diet with liberal water intake.  Continue WelChol.  May take Imodium as needed.  We will call to obtain pathology results from [facility].    8. Encounter for screening colonoscopy  Colonoscopy in 2016 per Dr. Grimaldo with polyps removed, hyperplastic.  Random colon and terminal ileum biopsies unremarkable. Colonoscopy in August 2022 by Dr. Montenegro in [facility] with no polyps removed.  Random biopsies were obtained, pathology results not available.  She was recommended colonoscopy in 5 years per Dr. Montenegro.  Colonoscopy in 2027 or sooner if needed.    Patient Instructions   Antireflux measures: Avoid fried, fatty foods, alcohol, chocolate, coffee, tea,  soft drinks, peppermint and spearmint, spicy foods, tomatoes and tomato based foods, onion based foods, and smoking.  Other antireflux measures include weight reduction if overweight, avoiding tight clothing around the abdomen, elevating the head of the bed 6 inches with blocks under the head board, and don't drink or eat before going to bed and avoid lying down immediately after meals.  Omeprazole 40 mg 1 po daily in the am 30 minutes before breakfast.  Recommended to take Levothyroxine first in the am upon waking, wait 30 minutes, then take Omeprazole 40 mg, wait 30 minutes, then eat breakfast and take other medications.  High fiber, low fat diet with  liberal water intake.   Continue Welcol tablets daily for diarrhea.   May take Imodium as needed.   Advised to exercise 30 minutes 4-5 days per week.   Advised to lose 15-20 pounds in the next 6-12 months.  Labs  Will call to obtain colonoscopy pathology.   Upper endoscopy-EGD: The indications, technique, alternatives and potential risk and complications were discussed with the patient including but not limited to bleeding, perforations, missing lesions and anesthetic complications. The patient understands and wishes to proceed with the procedure and has given their verbal consent. Written patient education information was given to the patient.   The patient will call if they have further questions before procedure.     Mediterranean Diet  A Mediterranean diet refers to food and lifestyle choices that are based on the traditions of countries located on the [place]. It focuses on eating more fruits, vegetables, whole grains, beans, nuts, seeds, and heart-healthy fats, and eating less dairy, meat, eggs, and processed foods with added sugar, salt, and fat. This way of eating has been shown to help prevent certain conditions and improve outcomes for people who have chronic diseases, like kidney disease and heart disease.  What are tips for following this plan?  Reading food labels  Check the serving size of packaged foods. For foods such as rice and pasta, the serving size refers to the amount of cooked product, not dry.  Check the total fat in packaged foods. Avoid foods that have saturated fat or trans fats.  Check the ingredient list for added sugars, such as corn syrup.  Shopping    Buy a variety of foods that offer a balanced diet, including:  Fresh fruits and vegetables (produce).  Grains, beans, nuts, and seeds. Some of these may be available in unpackaged forms or large amounts (in bulk).  Fresh seafood.  Poultry and eggs.  Low-fat dairy products.  Buy whole ingredients instead of prepackaged  foods.  Buy fresh fruits and vegetables in-season from local [employer] markets.  Buy plain frozen fruits and vegetables.  If you do not have access to quality fresh seafood, buy precooked frozen shrimp or canned fish, such as tuna, salmon, or sardines.  Stock your pantry so you always have certain foods on hand, such as olive oil, canned tuna, canned tomatoes, rice, pasta, and beans.  Cooking  Cook foods with extra-virgin olive oil instead of using butter or other vegetable oils.  Have meat as a side dish, and have vegetables or grains as your main dish. This means having meat in small portions or adding small amounts of meat to foods like pasta or stew.  Use beans or vegetables instead of meat in common dishes like chili or lasagna.  Candy Kitchen with different cooking methods. Try roasting, broiling, steaming, and sautéing vegetables.  Add frozen vegetables to soups, stews, pasta, or rice.  Add nuts or seeds for added healthy fats and plant protein at each meal. You can add these to yogurt, salads, or vegetable dishes.  Marinate fish or vegetables using olive oil, lemon juice, garlic, and fresh herbs.  Meal planning  Plan to eat one vegetarian meal one day each week. Try to work up to two vegetarian meals, if possible.  Eat seafood two or more times a week.  Have healthy snacks readily available, such as:  Vegetable sticks with hummus.  Greek yogurt.  Fruit and nut trail mix.  Eat balanced meals throughout the week. This includes:  Fruit: 2-3 servings a day.  Vegetables: 4-5 servings a day.  Low-fat dairy: 2 servings a day.  Fish, poultry, or lean meat: 1 serving a day.  Beans and legumes: 2 or more servings a week.  Nuts and seeds: 1-2 servings a day.  Whole grains: 6-8 servings a day.  Extra-virgin olive oil: 3-4 servings a day.  Limit red meat and sweets to only a few servings a month.  Lifestyle    Cook and eat meals together with your family, when possible.  Drink enough fluid to keep your urine pale yellow.  Be  physically active every day. This includes:  Aerobic exercise like running or swimming.  Leisure activities like gardening, walking, or housework.  Get 7-8 hours of sleep each night.    What foods should I eat?  Fruits  Apples. Apricots. Avocado. Berries. Bananas. Cherries. Dates. Figs. Grapes. [place]. Melon. Oranges. Peaches. Plums. Pomegranate.  Vegetables  Artichokes. Beets. Broccoli. Cabbage. Carrots. Eggplant. Green beans. Chard. Kale. Spinach. Onions. Leeks. Peas. Squash. Tomatoes. Peppers. Radishes.  Grains  Whole-grain pasta. Brown rice. Bulgur wheat. Polenta. Couscous. Whole-wheat bread. Oatmeal. Quinoa.  Meats and other proteins  Beans. Almonds. Sunflower seeds. Pine nuts. Peanuts. Cod. [place]. Scallops. Shrimp. Tuna. Tilapia. Clams. Oysters. Eggs. Poultry without skin.  Dairy  Low-fat milk. Cheese. Greek yogurt.  Fats and oils  Extra-virgin olive oil. Avocado oil. Grapeseed oil.  Beverages  Water.  Herbal tea.  Sweets and desserts  Greek yogurt with honey. Baked apples. Poached pears. Trail mix.  Seasonings and condiments  Basil. Cilantro. Coriander. Cumin. Mint. Parsley. [place]. Rosemary. Tarragon. Garlic. Oregano. Thyme. Pepper. Balsamic vinegar. Tahini. Hummus. Tomato sauce. Olives. Mushrooms.  The items listed above may not be a complete list of foods and beverages you can eat. Contact a dietitian for more information.    What foods should I limit?  This is a list of foods that should be eaten rarely or only on special occasions.  Fruits  Fruit canned in syrup.  Vegetables  Deep-fried potatoes (french fries).  Grains  Prepackaged pasta or rice dishes. Prepackaged cereal with added sugar. Prepackaged snacks with added sugar.  Meats and other proteins  Beef. Pork. Lamb. Poultry with skin. Hot dogs. [place].  Dairy  Ice cream. Sour cream. Whole milk.  Fats and oils  Butter. Canola oil. Vegetable oil. Beef fat (tallow). Lard.  Beverages  Juice. Sugar-sweetened soft drinks. Beer. Liquor and spirits.  Sweets  and desserts  Cookies. Cakes. Pies. Candy.  Seasonings and condiments  Mayonnaise. Pre-made sauces and marinades.  The items listed above may not be a complete list of foods and beverages you should limit. Contact a dietitian for more information.  Summary  The Mediterranean diet includes both food and lifestyle choices.  Eat a variety of fresh fruits and vegetables, beans, nuts, seeds, and whole grains.  Limit the amount of red meat and sweets that you eat.    This information is not intended to replace advice given to you by your health care provider. Make sure you discuss any questions you have with your health care provider.  Document Revised: 01/22/2021 Document Reviewed: 11/19/2020  Elsevier Patient Education © 2022 [employer].   Deemtrius High, APRN  6/13/2023    Please note that portions of this note may have been completed with a voice recognition program.

## 2023-06-13 NOTE — PATIENT INSTRUCTIONS
Antireflux measures: Avoid fried, fatty foods, alcohol, chocolate, coffee, tea,  soft drinks, peppermint and spearmint, spicy foods, tomatoes and tomato based foods, onion based foods, and smoking.  Other antireflux measures include weight reduction if overweight, avoiding tight clothing around the abdomen, elevating the head of the bed 6 inches with blocks under the head board, and don't drink or eat before going to bed and avoid lying down immediately after meals.  Omeprazole 40 mg 1 po daily in the am 30 minutes before breakfast.  Recommended to take Levothyroxine first in the am upon waking, wait 30 minutes, then take Omeprazole 40 mg, wait 30 minutes, then eat breakfast and take other medications.  High fiber, low fat diet with liberal water intake.   Continue Welcol tablets daily for diarrhea.   May take Imodium as needed.   Advised to exercise 30 minutes 4-5 days per week.   Advised to lose 15-20 pounds in the next 6-12 months.  Labs  Will call to obtain colonoscopy pathology.   Upper endoscopy-EGD: The indications, technique, alternatives and potential risk and complications were discussed with the patient including but not limited to bleeding, perforations, missing lesions and anesthetic complications. The patient understands and wishes to proceed with the procedure and has given their verbal consent. Written patient education information was given to the patient.   The patient will call if they have further questions before procedure.     Mediterranean Diet  A Mediterranean diet refers to food and lifestyle choices that are based on the traditions of countries located on the [place]. It focuses on eating more fruits, vegetables, whole grains, beans, nuts, seeds, and heart-healthy fats, and eating less dairy, meat, eggs, and processed foods with added sugar, salt, and fat. This way of eating has been shown to help prevent certain conditions and improve outcomes for people who have chronic  diseases, like kidney disease and heart disease.  What are tips for following this plan?  Reading food labels  Check the serving size of packaged foods. For foods such as rice and pasta, the serving size refers to the amount of cooked product, not dry.  Check the total fat in packaged foods. Avoid foods that have saturated fat or trans fats.  Check the ingredient list for added sugars, such as corn syrup.  Shopping    Buy a variety of foods that offer a balanced diet, including:  Fresh fruits and vegetables (produce).  Grains, beans, nuts, and seeds. Some of these may be available in unpackaged forms or large amounts (in bulk).  Fresh seafood.  Poultry and eggs.  Low-fat dairy products.  Buy whole ingredients instead of prepackaged foods.  Buy fresh fruits and vegetables in-season from local Fab markets.  Buy plain frozen fruits and vegetables.  If you do not have access to quality fresh seafood, buy precooked frozen shrimp or canned fish, such as tuna, salmon, or sardines.  Stock your pantry so you always have certain foods on hand, such as olive oil, canned tuna, canned tomatoes, rice, pasta, and beans.  Cooking  Cook foods with extra-virgin olive oil instead of using butter or other vegetable oils.  Have meat as a side dish, and have vegetables or grains as your main dish. This means having meat in small portions or adding small amounts of meat to foods like pasta or stew.  Use beans or vegetables instead of meat in common dishes like chili or lasagna.  Marueno with different cooking methods. Try roasting, broiling, steaming, and sautéing vegetables.  Add frozen vegetables to soups, stews, pasta, or rice.  Add nuts or seeds for added healthy fats and plant protein at each meal. You can add these to yogurt, salads, or vegetable dishes.  Marinate fish or vegetables using olive oil, lemon juice, garlic, and fresh herbs.  Meal planning  Plan to eat one vegetarian meal one day each week. Try to work up to two  vegetarian meals, if possible.  Eat seafood two or more times a week.  Have healthy snacks readily available, such as:  Vegetable sticks with hummus.  Greek yogurt.  Fruit and nut trail mix.  Eat balanced meals throughout the week. This includes:  Fruit: 2-3 servings a day.  Vegetables: 4-5 servings a day.  Low-fat dairy: 2 servings a day.  Fish, poultry, or lean meat: 1 serving a day.  Beans and legumes: 2 or more servings a week.  Nuts and seeds: 1-2 servings a day.  Whole grains: 6-8 servings a day.  Extra-virgin olive oil: 3-4 servings a day.  Limit red meat and sweets to only a few servings a month.  Lifestyle    Cook and eat meals together with your family, when possible.  Drink enough fluid to keep your urine pale yellow.  Be physically active every day. This includes:  Aerobic exercise like running or swimming.  Leisure activities like gardening, walking, or housework.  Get 7-8 hours of sleep each night.    What foods should I eat?  Fruits  Apples. Apricots. Avocado. Berries. Bananas. Cherries. Dates. Figs. Grapes. Dorothy. Melon. Oranges. Peaches. Plums. Pomegranate.  Vegetables  Artichokes. Beets. Broccoli. Cabbage. Carrots. Eggplant. Green beans. Chard. Kale. Spinach. Onions. Leeks. Peas. Squash. Tomatoes. Peppers. Radishes.  Grains  Whole-grain pasta. Brown rice. Bulgur wheat. Polenta. Couscous. Whole-wheat bread. Oatmeal. Quinoa.  Meats and other proteins  Beans. Almonds. Sunflower seeds. Pine nuts. Peanuts. Cod. Star. Scallops. Shrimp. Tuna. Tilapia. Clams. Oysters. Eggs. Poultry without skin.  Dairy  Low-fat milk. Cheese. Greek yogurt.  Fats and oils  Extra-virgin olive oil. Avocado oil. Grapeseed oil.  Beverages  Water.  Herbal tea.  Sweets and desserts  Greek yogurt with honey. Baked apples. Poached pears. Trail mix.  Seasonings and condiments  Basil. Cilantro. Coriander. Cumin. Mint. Parsley. Blaine. Rosemary. Tarragon. Garlic. Oregano. Thyme. Pepper. Balsamic vinegar. Tahini. Hummus. Tomato sauce.  Olives. Mushrooms.  The items listed above may not be a complete list of foods and beverages you can eat. Contact a dietitian for more information.    What foods should I limit?  This is a list of foods that should be eaten rarely or only on special occasions.  Fruits  Fruit canned in syrup.  Vegetables  Deep-fried potatoes (french fries).  Grains  Prepackaged pasta or rice dishes. Prepackaged cereal with added sugar. Prepackaged snacks with added sugar.  Meats and other proteins  Beef. Pork. Lamb. Poultry with skin. Hot dogs. Durbin.  Dairy  Ice cream. Sour cream. Whole milk.  Fats and oils  Butter. Canola oil. Vegetable oil. Beef fat (tallow). Lard.  Beverages  Juice. Sugar-sweetened soft drinks. Beer. Liquor and spirits.  Sweets and desserts  Cookies. Cakes. Pies. Candy.  Seasonings and condiments  Mayonnaise. Pre-made sauces and marinades.  The items listed above may not be a complete list of foods and beverages you should limit. Contact a dietitian for more information.  Summary  The Mediterranean diet includes both food and lifestyle choices.  Eat a variety of fresh fruits and vegetables, beans, nuts, seeds, and whole grains.  Limit the amount of red meat and sweets that you eat.    This information is not intended to replace advice given to you by your health care provider. Make sure you discuss any questions you have with your health care provider.  Document Revised: 01/22/2021 Document Reviewed: 11/19/2020  Elsevier Patient Education © 2022 [employer]

## 2023-06-14 LAB
ANA SER QL: NEGATIVE
HAV AB SER QL IA: NEGATIVE
HBV CORE AB SERPL QL IA: NEGATIVE
HBV SURFACE AB SER-ACNC: 6.8 MIU/ML
MITOCHONDRIA M2 IGG SER-ACNC: <20 UNITS (ref 0–20)
SMA IGG SER-ACNC: 7 UNITS (ref 0–19)

## 2023-06-16 LAB
A2 MACROGLOB SERPL-MCNC: 234 MG/DL (ref 110–276)
ALT SERPL W P-5'-P-CCNC: 46 IU/L (ref 0–40)
APO A-I SERPL-MCNC: 143 MG/DL (ref 116–209)
AST SERPL W P-5'-P-CCNC: 40 IU/L (ref 0–40)
BILIRUB SERPL-MCNC: 0.2 MG/DL (ref 0–1.2)
CHOLEST SERPL-MCNC: 137 MG/DL (ref 100–199)
FIBROSIS SCORING:: ABNORMAL
FIBROSIS STAGE SERPL QL: ABNORMAL
GGT SERPL-CCNC: 62 IU/L (ref 0–60)
GLUCOSE SERPL-MCNC: 174 MG/DL (ref 70–99)
HAPTOGLOB SERPL-MCNC: 71 MG/DL (ref 33–346)
LABORATORY COMMENT REPORT: ABNORMAL
LIVER FIBR SCORE SERPL CALC.FIBROSURE: 0.27 (ref 0–0.21)
LIVER STEATOSIS GRADE SERPL QL: ABNORMAL
LIVER STEATOSIS SCORE SERPL: 0.83 (ref 0–0.4)
NASH GRADE SERPL QL: ABNORMAL
NASH INTERPRETATION SERPL-IMP: ABNORMAL
NASH SCORE SERPL: 0.69 (ref 0–0.25)
NASH SCORING: ABNORMAL
STEATOSIS SCORING: ABNORMAL
TEST PERFORMANCE INFO SPEC: ABNORMAL
TEST PERFORMANCE INFO SPEC: ABNORMAL
TRIGL SERPL-MCNC: 238 MG/DL (ref 0–149)

## 2023-08-07 ENCOUNTER — TELEPHONE (OUTPATIENT)
Dept: GASTROENTEROLOGY | Facility: CLINIC | Age: 58
End: 2023-08-07
Payer: COMMERCIAL

## 2023-08-10 RX ORDER — PRAVASTATIN SODIUM 20 MG
20 TABLET ORAL NIGHTLY
COMMUNITY

## 2023-08-10 RX ORDER — AMITRIPTYLINE HYDROCHLORIDE 50 MG/1
50 TABLET, FILM COATED ORAL NIGHTLY
COMMUNITY

## 2023-08-10 RX ORDER — ASPIRIN 81 MG/1
81 TABLET ORAL NIGHTLY
COMMUNITY

## 2023-08-15 ENCOUNTER — HOSPITAL ENCOUNTER (OUTPATIENT)
Facility: HOSPITAL | Age: 58
Setting detail: HOSPITAL OUTPATIENT SURGERY
Discharge: HOME | End: 2023-08-15
Attending: INTERNAL MEDICINE | Admitting: INTERNAL MEDICINE
Payer: COMMERCIAL

## 2023-08-15 ENCOUNTER — ANESTHESIA (OUTPATIENT)
Dept: GASTROENTEROLOGY | Facility: HOSPITAL | Age: 58
End: 2023-08-15
Payer: COMMERCIAL

## 2023-08-15 ENCOUNTER — ANESTHESIA EVENT (OUTPATIENT)
Dept: GASTROENTEROLOGY | Facility: HOSPITAL | Age: 58
End: 2023-08-15
Payer: COMMERCIAL

## 2023-08-15 VITALS
WEIGHT: 187 LBS | DIASTOLIC BLOOD PRESSURE: 60 MMHG | OXYGEN SATURATION: 99 % | HEART RATE: 72 BPM | TEMPERATURE: 97.1 F | HEIGHT: 64 IN | BODY MASS INDEX: 31.92 KG/M2 | RESPIRATION RATE: 20 BRPM | SYSTOLIC BLOOD PRESSURE: 105 MMHG

## 2023-08-15 DIAGNOSIS — K21.9 GASTROESOPHAGEAL REFLUX DISEASE, UNSPECIFIED WHETHER ESOPHAGITIS PRESENT: ICD-10-CM

## 2023-08-15 DIAGNOSIS — D64.9 ANEMIA, UNSPECIFIED TYPE: ICD-10-CM

## 2023-08-15 LAB — GLUCOSE BLDC GLUCOMTR-MCNC: 150 MG/DL (ref 70–130)

## 2023-08-15 PROCEDURE — 82948 REAGENT STRIP/BLOOD GLUCOSE: CPT

## 2023-08-15 PROCEDURE — 25010000002 PROPOFOL 10 MG/ML EMULSION: Performed by: NURSE ANESTHETIST, CERTIFIED REGISTERED

## 2023-08-15 PROCEDURE — 43239 EGD BIOPSY SINGLE/MULTIPLE: CPT | Performed by: INTERNAL MEDICINE

## 2023-08-15 RX ORDER — SODIUM CHLORIDE 9 MG/ML
70 INJECTION, SOLUTION INTRAVENOUS CONTINUOUS PRN
Status: DISCONTINUED | OUTPATIENT
Start: 2023-08-15 | End: 2023-08-15 | Stop reason: HOSPADM

## 2023-08-15 RX ORDER — LIDOCAINE HYDROCHLORIDE 20 MG/ML
INJECTION, SOLUTION INTRAVENOUS AS NEEDED
Status: DISCONTINUED | OUTPATIENT
Start: 2023-08-15 | End: 2023-08-15 | Stop reason: SURG

## 2023-08-15 RX ORDER — PROPOFOL 10 MG/ML
VIAL (ML) INTRAVENOUS CONTINUOUS PRN
Status: DISCONTINUED | OUTPATIENT
Start: 2023-08-15 | End: 2023-08-15 | Stop reason: SURG

## 2023-08-15 RX ADMIN — SODIUM CHLORIDE 70 ML/HR: 9 INJECTION, SOLUTION INTRAVENOUS at 10:03

## 2023-08-15 RX ADMIN — PROPOFOL 100 MCG/KG/MIN: 10 INJECTION, EMULSION INTRAVENOUS at 11:49

## 2023-08-15 RX ADMIN — LIDOCAINE HYDROCHLORIDE 60 MG: 20 INJECTION, SOLUTION INTRAVENOUS at 11:49

## 2023-08-15 NOTE — ANESTHESIA PREPROCEDURE EVALUATION
Anesthesia Evaluation     Patient summary reviewed and Nursing notes reviewed   NPO Solid Status: > 8 hours  NPO Liquid Status: > 8 hours           Airway   Mallampati: II  TM distance: >3 FB  Neck ROM: full  Possible difficult intubation and Difficult intubation highly probable  Dental      Pulmonary    (+) pneumonia , asthma,shortness of breath, sleep apnea, decreased breath sounds  (-) not a smoker  Cardiovascular     PT is on anticoagulation therapy    (+) hypertension, valvular problems/murmurs murmurDOE, hyperlipidemia      Neuro/Psych  (+) headaches, psychiatric history  GI/Hepatic/Renal/Endo    (+) obesity, morbid obesity, GERD, liver disease fatty liver disease history of elevated LFT, diabetes mellitus type 2, thyroid problem hypothyroidism    Musculoskeletal     (+) arthralgias, back pain, myalgias  Abdominal   (+) obese   Substance History      OB/GYN          Other   arthritis,     ROS/Med Hx Other: Rls                   Anesthesia Plan    ASA 3     MAC     (Risks and benefits discussed including risk of aspiration, recall and dental damage. All patient questions answered.    Will continue with plan of care.)  intravenous induction     Anesthetic plan, risks, benefits, and alternatives have been provided, discussed and informed consent has been obtained with: patient.  Pre-procedure education provided    CODE STATUS:

## 2023-08-15 NOTE — DISCHARGE INSTRUCTIONS
Rest today  No pushing,pulling,tugging,heavy lifting, or strenuous activity   No major decision making,driving,or drinking alcoholic beverages for 24 hours due to the sedation you received  Always use good hand hygiene/washing technique  No driving on pain medication.    To assist you in voiding:  Drink plenty of fluids  Listen to running water while attempting to void.    If you are unable to urinate and you have an uncomfortable urge to void or it has been   6 hours since you were discharged, return to the Emergency Room.    - Discharge patient to home (ambulatory).   - Previous diet diet.   - Continue present medications.   - Await pathology results.   - Return to my office in 8 weeks.

## 2023-08-15 NOTE — ANESTHESIA POSTPROCEDURE EVALUATION
Patient: Meryl Perla    Procedure Summary       Date: 08/15/23 Room / Location: [facility] ENDOSCOPY 2 / [facility] ENDOSCOPY    Anesthesia Start: 1144 Anesthesia Stop: 1202    Procedure: ESOPHAGOGASTRODUODENOSCOPY WITH BIOPSY AND COLD BIOPSY POLYPECTOMY (Esophagus) Diagnosis:       Anemia, unspecified type      Gastroesophageal reflux disease, unspecified whether esophagitis present      (Anemia, unspecified type [D64.9])      (Gastroesophageal reflux disease, unspecified whether esophagitis present [K21.9])    Surgeons: Xin Fiore MD Provider: Koby Langley CRNA    Anesthesia Type: MAC ASA Status: 3            Anesthesia Type: MAC    Vitals  No vitals data found for the desired time range.          Post Anesthesia Care and Evaluation    Patient location during evaluation: bedside  Patient participation: complete - patient participated  Level of consciousness: awake and alert  Pain score: 0  Pain management: adequate    Airway patency: patent  Anesthetic complications: No anesthetic complications  PONV Status: none  Cardiovascular status: acceptable  Respiratory status: acceptable  Hydration status: acceptable

## 2023-08-15 NOTE — H&P
"    [facility]  HISTORY AND PHYSICAL    Patient Name: Meryl Perla  : 1965  MRN: [id number]    Chief Complaint:   For EGD    History Of Presenting Illness:    Anemia  GERD   Diarrhea  Lower abdominal pain       Past Medical History:   Diagnosis Date    Anxiety 1982    Asthma 1993    Back pain 2013    Bronchitis     \"in the past\"    Colon polyp 2016    Depression 1982    Diabetes mellitus 2015    Elevated liver enzymes     Fatty liver     Fibromyalgia     GERD (gastroesophageal reflux disease)     Heart murmur     \"younger\"    Hyperlipidemia     Hypertension 2012    Hypothyroidism 2013    Migraine 2013    Osteoarthritis 2013    Pneumonia     \"in the past\"    RLS (restless legs syndrome)     Sinus problem 1993    Snores        Past Surgical History:   Procedure Laterality Date     SECTION  ,     CHOLECYSTECTOMY  2006    gallstones    COLONOSCOPY      COLONOSCOPY      COLONOSCOPY  2016    HYSTERECTOMY  2008    Partial hysty    OVARY SURGERY      removal of ovary    UPPER GASTROINTESTINAL ENDOSCOPY      UPPER GASTROINTESTINAL ENDOSCOPY  2006    WISDOM TOOTH EXTRACTION         Social History     Socioeconomic History    Marital status:    Tobacco Use    Smoking status: Never    Smokeless tobacco: Never   Vaping Use    Vaping Use: Never used   Substance and Sexual Activity    Alcohol use: No     Comment: Former use; quit in     Drug use: No    Sexual activity: Defer       Family History   Problem Relation Age of Onset    Hypertension Mother     Stroke Mother     Mental illness Mother     Diabetes Father     Hypertension Father     Cancer Other     Stomach cancer Maternal Grandmother     Colon cancer Neg Hx     Ulcerative colitis Neg Hx     Esophageal cancer Neg Hx     Liver cancer Neg Hx     Liver disease Neg Hx        Prior to Admission Medications:  Medications Prior to Admission   Medication Sig Dispense Refill Last Dose    " amitriptyline (ELAVIL) 50 MG tablet Take 1 tablet by mouth Every Night.   8/14/2023 at 2200    ARIPiprazole (ABILIFY) 5 MG tablet Take 1 tablet by mouth Daily.   8/14/2023 at 0800    aspirin 81 MG EC tablet Take 1 tablet by mouth Every Night.   8/11/2023    buPROPion XL (WELLBUTRIN XL) 150 MG 24 hr tablet Take 3 tabs every day  1 8/14/2023 at 0800    dicyclomine (BENTYL) 10 MG capsule TK 1 C PO TID  5 8/14/2023 at 2100    DULoxetine (CYMBALTA) 60 MG capsule Take 1 capsule by mouth Daily.       Emollient (CERAVE) lotion USE UTD ON PACKAGE  0 8/14/2023 at 0800    glipiZIDE (GLUCOTROL) 5 MG ER tablet Take 2 tablets by mouth 2 (Two) Times a Day.  3 8/14/2023 at 0800    levothyroxine (SYNTHROID, LEVOTHROID) 88 MCG tablet Take 1 tablet by mouth Daily.   8/14/2023 at 0700    lisinopril-hydrochlorothiazide (PRINZIDE,ZESTORETIC) 20-12.5 MG per tablet Take 1 tablet by mouth Daily.   8/14/2023 at 0800    metFORMIN (GLUCOPHAGE) 500 MG tablet Take 2 tablets by mouth 2 (Two) Times a Day.   8/14/2023 at 1700    montelukast (SINGULAIR) 10 MG tablet Take 1 tablet by mouth Daily.   8/14/2023 at 2100    omeprazole (PriLOSEC) 40 MG capsule TK ONE C PO QD BEFORE A MEAL  1 8/14/2023 at 0800    pravastatin (PRAVACHOL) 20 MG tablet Take 1 tablet by mouth Every Night.   8/14/2023 at 0800    pregabalin (LYRICA) 100 MG capsule pregabalin 100 mg capsule   TAKE 1 CAPSULE BY MOUTH TWICE DAILY   8/14/2023 at 2100    rOPINIRole (REQUIP) 0.25 MG tablet Take 4 tablets by mouth Every Night. Take 1 hour before bedtime.   8/14/2023 at 0800    SITagliptin (JANUVIA) 100 MG tablet Take 1 tablet by mouth Daily.   8/14/2023 at 0800    Trulicity 3 MG/0.5ML solution pen-injector    8/14/2023 at 0800    WELCHOL 625 MG tablet TK 1 T PO TID WITH A MEAL AND LIQUID  5 Past Month    cholecalciferol (VITAMIN D3) 97474 UNITS capsule Take 1 capsule by mouth 1 (One) Time Per Week.       ibuprofen (ADVIL,MOTRIN) 600 MG tablet Take 1 tablet by mouth Every 8 (Eight) Hours  "As Needed.       traMADol (ULTRAM) 50 MG tablet TK 1 T PO HS  1        Allergies:  Allergies   Allergen Reactions    Ceftin [Cefuroxime] Hives    Erythromycin Diarrhea and GI Intolerance    Keflex [Cephalexin] Hives    Adhesive Tape Other (See Comments)     \"Blister\"        Vitals: Temp:  [96.8 øF (36 øC)] 96.8 øF (36 øC)  Heart Rate:  [85] 85  Resp:  [18] 18  BP: (108)/(61) 108/61    Review Of Systems:  Constitutional:  Negative for chills, fever, and unexpected weight change.  Respiratory:  Negative for cough, chest tightness, shortness of breath, and wheezing.  Cardiovascular:  Negative for chest pain, palpitations, and leg swelling.  Gastrointestinal:  Negative for abdominal distention, abdominal pain, nausea, vomiting.  Neurological:  Negative for weakness, numbness, and headaches.     Physical Exam:    General Appearance:  Alert, cooperative, in no acute distress.   Lungs:   Clear to auscultation, respirations regular, even and                 unlabored.   Heart:  Regular rhythm and normal rate.   Abdomen:   Normal bowel sounds, no masses, no organomegaly. Soft, nontender, nondistended   Neurologic: Alert and oriented x 3. Moves all four limbs equally       Assessment & Plan     Assessment:  Active Problems:    Anemia    Gastroesophageal reflux disease      Plan: ESOPHAGOGASTRODUODENOSCOPY (N/A)     Xin Fiore MD  8/15/2023      "

## 2023-08-16 LAB — REF LAB TEST METHOD: NORMAL

## 2023-11-27 ENCOUNTER — LAB (OUTPATIENT)
Dept: LAB | Facility: HOSPITAL | Age: 58
End: 2023-11-27
Payer: COMMERCIAL

## 2023-11-27 ENCOUNTER — OFFICE VISIT (OUTPATIENT)
Dept: GASTROENTEROLOGY | Facility: CLINIC | Age: 58
End: 2023-11-27
Payer: COMMERCIAL

## 2023-11-27 VITALS
OXYGEN SATURATION: 98 % | RESPIRATION RATE: 12 BRPM | SYSTOLIC BLOOD PRESSURE: 96 MMHG | DIASTOLIC BLOOD PRESSURE: 58 MMHG | HEIGHT: 64 IN | HEART RATE: 90 BPM | BODY MASS INDEX: 30.39 KG/M2 | WEIGHT: 178 LBS

## 2023-11-27 DIAGNOSIS — R19.7 DIARRHEA, UNSPECIFIED TYPE: Chronic | ICD-10-CM

## 2023-11-27 DIAGNOSIS — K75.81 NASH (NONALCOHOLIC STEATOHEPATITIS): Chronic | ICD-10-CM

## 2023-11-27 DIAGNOSIS — D64.9 ANEMIA, UNSPECIFIED TYPE: Chronic | ICD-10-CM

## 2023-11-27 DIAGNOSIS — E66.09 CLASS 1 OBESITY DUE TO EXCESS CALORIES WITH SERIOUS COMORBIDITY AND BODY MASS INDEX (BMI) OF 30.0 TO 30.9 IN ADULT: Chronic | ICD-10-CM

## 2023-11-27 DIAGNOSIS — K58.0 IRRITABLE BOWEL SYNDROME WITH DIARRHEA: Chronic | ICD-10-CM

## 2023-11-27 DIAGNOSIS — Z12.11 ENCOUNTER FOR SCREENING FOR MALIGNANT NEOPLASM OF COLON: ICD-10-CM

## 2023-11-27 DIAGNOSIS — R19.7 DIARRHEA, UNSPECIFIED TYPE: Primary | Chronic | ICD-10-CM

## 2023-11-27 DIAGNOSIS — K21.9 GASTROESOPHAGEAL REFLUX DISEASE WITHOUT ESOPHAGITIS: Chronic | ICD-10-CM

## 2023-11-27 LAB
ALBUMIN SERPL-MCNC: 5.2 G/DL (ref 3.5–5.2)
ALBUMIN/GLOB SERPL: 1.9 G/DL
ALP SERPL-CCNC: 72 U/L (ref 39–117)
ALT SERPL W P-5'-P-CCNC: 34 U/L (ref 1–33)
ANION GAP SERPL CALCULATED.3IONS-SCNC: 16 MMOL/L (ref 5–15)
AST SERPL-CCNC: 32 U/L (ref 1–32)
BILIRUB SERPL-MCNC: 0.4 MG/DL (ref 0–1.2)
BUN SERPL-MCNC: 23 MG/DL (ref 6–20)
BUN/CREAT SERPL: 15.4 (ref 7–25)
CALCIUM SPEC-SCNC: 10.2 MG/DL (ref 8.6–10.5)
CHLORIDE SERPL-SCNC: 101 MMOL/L (ref 98–107)
CO2 SERPL-SCNC: 22 MMOL/L (ref 22–29)
CREAT SERPL-MCNC: 1.49 MG/DL (ref 0.57–1)
EGFRCR SERPLBLD CKD-EPI 2021: 40.6 ML/MIN/1.73
GLOBULIN UR ELPH-MCNC: 2.7 GM/DL
GLUCOSE SERPL-MCNC: 118 MG/DL (ref 65–99)
IGA1 MFR SER: 246 MG/DL (ref 70–400)
INR PPP: 0.96 (ref 0.9–1.1)
POTASSIUM SERPL-SCNC: 4.5 MMOL/L (ref 3.5–5.2)
PROT SERPL-MCNC: 7.9 G/DL (ref 6–8.5)
PROTHROMBIN TIME: 13.3 SECONDS (ref 12.3–15.1)
SODIUM SERPL-SCNC: 139 MMOL/L (ref 136–145)
TSH SERPL DL<=0.05 MIU/L-ACNC: 3.99 UIU/ML (ref 0.27–4.2)

## 2023-11-27 PROCEDURE — 85610 PROTHROMBIN TIME: CPT

## 2023-11-27 PROCEDURE — 84443 ASSAY THYROID STIM HORMONE: CPT

## 2023-11-27 PROCEDURE — 85027 COMPLETE CBC AUTOMATED: CPT

## 2023-11-27 PROCEDURE — 36415 COLL VENOUS BLD VENIPUNCTURE: CPT

## 2023-11-27 PROCEDURE — 80053 COMPREHEN METABOLIC PANEL: CPT

## 2023-11-27 PROCEDURE — 86364 TISS TRNSGLTMNASE EA IG CLAS: CPT

## 2023-11-27 PROCEDURE — 82784 ASSAY IGA/IGD/IGG/IGM EACH: CPT

## 2023-11-27 NOTE — PATIENT INSTRUCTIONS
Antireflux measures: Avoid fried, fatty foods, alcohol, chocolate, coffee, tea,  soft drinks, peppermint and spearmint, spicy foods, tomatoes and tomato based foods, onion based foods, and smoking.  Other antireflux measures include weight reduction if overweight, avoiding tight clothing around the abdomen, elevating the head of the bed 6 inches with blocks under the head board, and don't drink or eat before going to bed and avoid lying down immediately after meals.  Omeprazole 40 mg 1 po daily in the am 30 minutes before breakfast.  Recommended to take Levothyroxine first in the am upon waking, wait 30 minutes, then take Omeprazole 40 mg, wait 30 minutes, then eat breakfast and take other medications.  High fiber, low fat diet with liberal water intake.   Low FODMAP diet - avoid all dairy. May use lactose free/dairy free alternatives such as almond milk, rice milk, oat milk, etc.   Continue Welcol tablets daily for diarrhea.   May take Imodium as needed.   Advised to exercise 30 minutes 4-5 days per week.   Advised to lose 10-15 pounds in the next 6-12 months.  The patient is not immune to hepatitis A or hepatitis B and needs vaccines which may be obtained through the health department or PCP office.  Labs  Stool studies  Colonoscopy for screening in 2027 or sooner if needed.   Follow up: 6 months or sooner if needed      Low-FODMAP Eating Plan    FODMAP stands for fermentable oligosaccharides, disaccharides, monosaccharides, and polyols. These are sugars that are hard for some people to digest. A low-FODMAP eating plan may help some people who have irritable bowel syndrome (IBS) and certain other bowel (intestinal) diseases to manage their symptoms.  This meal plan can be complicated to follow. Work with a diet and nutrition specialist (dietitian) to make a low-FODMAP eating plan that is right for you. A dietitian can help make sure that you get enough nutrition from this diet.  What are tips for following this  plan?  Reading food labels  Check labels for hidden FODMAPs such as:  High-fructose syrup.  Honey.  Agave.  Natural fruit flavors.  Onion or garlic powder.  Choose low-FODMAP foods that contain 3-4 grams of fiber per serving.  Check food labels for serving sizes. Eat only one serving at a time to make sure FODMAP levels stay low.  Shopping  Shop with a list of foods that are recommended on this diet and make a meal plan.  Meal planning  Follow a low-FODMAP eating plan for up to 6 weeks, or as told by your health care provider or dietitian.  To follow the eating plan:  Eliminate high-FODMAP foods from your diet completely. Choose only low-FODMAP foods to eat. You will do this for 2-6 weeks.  Gradually reintroduce high-FODMAP foods into your diet one at a time. Most people should wait a few days before introducing the next new high-FODMAP food into their meal plan. Your dietitian can recommend how quickly you may reintroduce foods.  Keep a daily record of what and how much you eat and drink. Make note of any symptoms that you have after eating.  Review your daily record with a dietitian regularly to identify which foods you can eat and which foods you should avoid.  General tips  Drink enough fluid each day to keep your urine pale yellow.  Avoid processed foods. These often have added sugar and may be high in FODMAPs.  Avoid most dairy products, whole grains, and sweeteners.  Work with a dietitian to make sure you get enough fiber in your diet.  Avoid high FODMAP foods at meals to manage symptoms.    Recommended foods  Fruits  Bananas, oranges, tangerines, jamison, limes, blueberries, raspberries, strawberries, grapes, cantaloupe, honeydew melon, kiwi, papaya, passion fruit, and pineapple. Limited amounts of dried cranberries, banana chips, and shredded coconut.  Vegetables  Eggplant, zucchini, cucumber, peppers, green beans, bean sprouts, lettuce, arugula, kale, Swiss chard, spinach, maynor greens, bok fabiola, summer  "squash, potato, and tomato. Limited amounts of corn, carrot, and sweet potato. Green parts of scallions.  Grains  Gluten-free grains, such as rice, oats, buckwheat, quinoa, corn, polenta, and millet. Gluten-free pasta, bread, or cereal. Rice noodles. Corn tortillas.  Meats and other proteins  Unseasoned beef, pork, poultry, or fish. Eggs. Durbin. Tofu (firm) and tempeh. Limited amounts of nuts and seeds, such as almonds, walnuts, brazil nuts, pecans, peanuts, nut butters, pumpkin seeds, gamaliel seeds, and sunflower seeds.  Dairy  Lactose-free milk, yogurt, and kefir. Lactose-free cottage cheese and ice cream. Non-dairy milks, such as almond, coconut, hemp, and rice milk. Non-dairy yogurt. Limited amounts of goat cheese, brie, mozzarella, parmesan, swiss, and other hard cheeses.  Fats and oils  Butter-free spreads. Vegetable oils, such as olive, canola, and sunflower oil.  Seasoning and other foods  Artificial sweeteners with names that do not end in \"ol,\" such as aspartame, saccharine, and stevia. Maple syrup, white table sugar, raw sugar, brown sugar, and molasses. Mayonnaise, soy sauce, and tamari. Fresh basil, coriander, parsley, rosemary, and thyme.  Beverages  Water and mineral water. Sugar-sweetened soft drinks. Small amounts of orange juice or cranberry juice. Black and green tea. Most dry faby. Coffee.  The items listed above may not be a complete list of foods and beverages you can eat. Contact a dietitian for more information.    Foods to avoid  Fruits  Fresh, dried, and juiced forms of apple, pear, watermelon, peach, plum, cherries, apricots, blackberries, boysenberries, figs, nectarines, and zuleyma. Avocado.  Vegetables  Chicory root, artichoke, asparagus, cabbage, snow peas, [place] sprouts, broccoli, sugar snap peas, mushrooms, celery, and cauliflower. Onions, garlic, leeks, and the white part of scallions.  Grains  Wheat, including kamut, durum, and semolina. Barley and bulgur. Couscous. Wheat-based " cereals. Wheat noodles, bread, crackers, and pastries.  Meats and other proteins  Fried or fatty meat. Sausage. Cashews and pistachios. Soybeans, baked beans, black beans, chickpeas, kidney beans, fabiola beans, navy beans, lentils, black-eyed peas, and split peas.  Dairy  Milk, yogurt, ice cream, and soft cheese. Cream and sour cream. Milk-based sauces. Custard. Buttermilk. Soy milk.  Seasoning and other foods  Any sugar-free gum or candy. Foods that contain artificial sweeteners such as sorbitol, mannitol, isomalt, or xylitol. Foods that contain honey, high-fructose corn syrup, or agave. Bouillon, vegetable stock, beef stock, and chicken stock. Garlic and onion powder. Condiments made with onion, such as hummus, chutney, pickles, relish, salad dressing, and salsa. Tomato paste.  Beverages  Chicory-based drinks. Coffee substitutes. Chamomile tea. Fennel tea. Sweet or fortified faby such as port or lu. Diet soft drinks made with isomalt, mannitol, maltitol, sorbitol, or xylitol. Apple, pear, and zuleyma juice. Juices with high-fructose corn syrup.  The items listed above may not be a complete list of foods and beverages you should avoid. Contact a dietitian for more information.    Summary  FODMAP stands for fermentable oligosaccharides, disaccharides, monosaccharides, and polyols. These are sugars that are hard for some people to digest.  A low-FODMAP eating plan is a short-term diet that helps to ease symptoms of certain bowel diseases.  The eating plan usually lasts up to 6 weeks. After that, high-FODMAP foods are reintroduced gradually and one at a time. This can help you find out which foods may be causing symptoms.  A low-FODMAP eating plan can be complicated. It is best to work with a dietitian who has experience with this type of plan.  This information is not intended to replace advice given to you by your health care provider. Make sure you discuss any questions you have with your health care  provider.  Document Revised: 05/06/2021 Document Reviewed: 05/06/2021  Elsevier Patient Education © 2023 [employer]

## 2023-11-27 NOTE — PROGRESS NOTES
"     Follow Up Note     Date: 2023   Patient Name: Meryl Perla  MRN: [id number]  : 1965     Primary Care Provider: Yoanna Valle APRN     Chief Complaint   Patient presents with    Follow-up     History of present illness:   2023  Meryl Perla is a 58 y.o. female who is here today for follow up after EGD. Reflux is doing ok at this time. Diarrhea has worsened after taking antibiotics for a dog bite. She has had 2-3 rounds of antibiotics. She is having to take up to 6 Welchol tablets per day to slow down diarrhea. No significant abdominal pain. Denies rectal bleeding.     Interval History:  2023  Meryl Prela is a 57 y.o. female who is here today to establish care with gastroenterology for elevated liver enzymes.      She has a history of elevated liver enzymes for the past 5-6 years. She denies personal or family history of liver disease. There is no history of IVDA, alcohol use, tattoos or blood transfusions.      She has a history of anemia for the past year or so. There is no history of GI bleeding, denies hematemesis, melena or hematochezia. There is no history of hematuria or vaginal bleeding.      She has a history of reflux for many years that is reasonably controlled with Omeprazole 40 mg daily. No difficulty swallowing. Denies nausea or vomiting.      She has a history of \"bile acid diarrhea\" for many years that is reasonably controlled with Welchol tablet 1-3 times per day. She usually has 1-2 soft bowel movements per day, occasionally has 3. She has occasional abdominal pain associated with bowel movements if having diarrhea, maybe 1-2 times per week.      Her last colonoscopy was in 2022 by Dr. Montenegro in [place], [place]. Her last EGD was 7-8 years ago. Her grandmother had stomach cancer. No family history of colon cancer.    Subjective      Past Medical History:   Diagnosis Date    Anxiety     Asthma     Back pain 2013    Bronchitis     \"in the past\"    " "Colon polyp 2016    Depression 1982    Diabetes mellitus 2015    Elevated liver enzymes     Fatty liver     Fibromyalgia 2013    GERD (gastroesophageal reflux disease)     Heart murmur     \"younger\"    Hyperlipidemia     Hypertension 2012    Hypothyroidism 2013    Migraine 2013    Osteoarthritis 2013    Pneumonia     \"in the past\"    RLS (restless legs syndrome)     Sinus problem 1993    Snores      Past Surgical History:   Procedure Laterality Date     SECTION  ,     CHOLECYSTECTOMY  2006    gallstones    COLONOSCOPY      COLONOSCOPY      COLONOSCOPY  2016    ENDOSCOPY N/A 8/15/2023    Procedure: ESOPHAGOGASTRODUODENOSCOPY WITH BIOPSY AND COLD BIOPSY POLYPECTOMY;  Surgeon: Xin Fiore MD;  Location: [facility] ENDOSCOPY;  Service: Gastroenterology;  Laterality: N/A;    HYSTERECTOMY      Partial hysty    OVARY SURGERY      removal of ovary    UPPER GASTROINTESTINAL ENDOSCOPY      UPPER GASTROINTESTINAL ENDOSCOPY      WISDOM TOOTH EXTRACTION       Family History   Problem Relation Age of Onset    Hypertension Mother     Stroke Mother     Mental illness Mother     Diabetes Father     Hypertension Father     Cancer Other     Stomach cancer Maternal Grandmother     Colon cancer Neg Hx     Ulcerative colitis Neg Hx     Esophageal cancer Neg Hx     Liver cancer Neg Hx     Liver disease Neg Hx      Social History     Socioeconomic History    Marital status:    Tobacco Use    Smoking status: Never    Smokeless tobacco: Never   Vaping Use    Vaping Use: Never used   Substance and Sexual Activity    Alcohol use: No     Comment: Former use; quit in     Drug use: No    Sexual activity: Defer       Current Outpatient Medications:     dicyclomine (BENTYL) 10 MG capsule, TK 1 C PO TID, Disp: , Rfl: 5    glipiZIDE (GLUCOTROL) 5 MG ER tablet, Take 2 tablets by mouth 2 (Two) Times a Day., Disp: , Rfl: 3    levothyroxine (SYNTHROID, LEVOTHROID) 88 MCG tablet, " "Take 1 tablet by mouth Daily., Disp: , Rfl:     lisinopril-hydrochlorothiazide (PRINZIDE,ZESTORETIC) 20-12.5 MG per tablet, Take 1 tablet by mouth Daily., Disp: , Rfl:     metFORMIN (GLUCOPHAGE) 500 MG tablet, Take 2 tablets by mouth 2 (Two) Times a Day., Disp: , Rfl:     montelukast (SINGULAIR) 10 MG tablet, Take 1 tablet by mouth Daily., Disp: , Rfl:     omeprazole (PriLOSEC) 40 MG capsule, TK ONE C PO QD BEFORE A MEAL, Disp: , Rfl: 1    pravastatin (PRAVACHOL) 20 MG tablet, Take 1 tablet by mouth Every Night., Disp: , Rfl:     pregabalin (LYRICA) 100 MG capsule, pregabalin 100 mg capsule  TAKE 1 CAPSULE BY MOUTH TWICE DAILY, Disp: , Rfl:     rOPINIRole (REQUIP) 0.25 MG tablet, Take 4 tablets by mouth Every Night. Take 1 hour before bedtime., Disp: , Rfl:     SITagliptin (JANUVIA) 100 MG tablet, Take 1 tablet by mouth Daily., Disp: , Rfl:     Trulicity 3 MG/0.5ML solution pen-injector, , Disp: , Rfl:     WELCHOL 625 MG tablet, , Disp: , Rfl: 5    amitriptyline (ELAVIL) 50 MG tablet, Take 1 tablet by mouth Every Night. (Patient not taking: Reported on 11/27/2023), Disp: , Rfl:     aspirin 81 MG EC tablet, Take 1 tablet by mouth Every Night. (Patient not taking: Reported on 11/27/2023), Disp: , Rfl:     Emollient (CERAVE) lotion, USE UTD ON PACKAGE (Patient not taking: Reported on 11/27/2023), Disp: , Rfl: 0    Allergies   Allergen Reactions    Cefuroxime Axetil Hives    Cephalexin Hives    Ceftin [Cefuroxime] Hives    Erythromycin Diarrhea and GI Intolerance     \"Severe bloating, diarrhea, and heartburn.\"    Adhesive Tape Other (See Comments)     \"Blister\"     The following portions of the patient's history were reviewed and updated as appropriate: allergies, current medications, past family history, past medical history, past social history, past surgical history and problem list.  Objective     Physical Exam  Vitals and nursing note reviewed.   Constitutional:       General: She is not in acute distress.     " "Appearance: Normal appearance. She is well-developed.   HENT:      Head: Normocephalic and atraumatic.      Mouth/Throat:      Mouth: Mucous membranes are not pale, not dry and not cyanotic.   Eyes:      General: Lids are normal.   Neck:      Trachea: Trachea normal.   Cardiovascular:      Rate and Rhythm: Normal rate.   Pulmonary:      Effort: Pulmonary effort is normal. No respiratory distress.      Breath sounds: Normal breath sounds.   Abdominal:      Tenderness: There is no abdominal tenderness.   Skin:     General: Skin is warm and dry.   Neurological:      Mental Status: She is alert and oriented to person, place, and time.   Psychiatric:         Mood and Affect: Mood normal.         Speech: Speech normal.         Behavior: Behavior normal. Behavior is cooperative.       Vitals:    23 1552   BP: 96/58   Pulse: 90   Resp: 12   SpO2: 98%   Weight: 80.7 kg (178 lb)   Height: 162.6 cm (64\")     Body mass index is 30.55 kg/m².     Results Review:   I reviewed the patient's new clinical results.    No visits with results within 90 Day(s) from this visit.   Latest known visit with results is:   Admission on 08/15/2023, Discharged on 08/15/2023   Component Date Value Ref Range Status    Glucose 08/15/2023 150 (H)  70 - 130 mg/dL Final    Serial Number: [id number]:  [id number]    Reference Lab Report 08/15/2023    Final                    Value:[facility]  [address] [place] [address]  Phone: [phone] or [phone]  Fax: [phone]  Rony Lenz M.D., Medical Director    PATIENT NAME                                     LABORATORY NO.  [id number]                                   [id number]  [id number]                                 AGE                    SEX   SSN              CLIENT REF #  [place] [id number]        1965      F     [id number]      [id number]    [address]                        " REQUESTING M.D.           ATTENDING M.D.         COPY TO.  [address]                                [place] [address]                         BRUNILDA PAGE  DATE COLLECTED            DATE RECEIVED          DATE REPORTED  08/15/2023                08/15/2023             08/16/2023    DIAGNOSIS:  A.     DUODENUM, BIOPSY:  Duodenal type mucosa with no significant                           histopathologic abnormalities  Negative for Celiac disease, metaplasia, microorganisms or atypia  B.     ANTRUM AND BODY, BIOPSY:  Gastric mucosa without pathologic alterations  Negative for H. pylori, metaplasia or dysplasia  C.     STOMACH, BODY, POLYP:  Fundic gland polyp  Negative for H. pylori, metaplasia , dysplasia or malignancy    RLL                            REVIEWED, DIAGNOSED AND ELECTRONICALLY  SIGNED BY:    Rony Lenz M.D., F.C.A.P.  CPT CODES:  88305x3        Dated 4/7/2022 albumin 4.6 alkaline phosphatase 93 ALT 36 AST 46 total bilirubin <0.2 hemoglobin 10.4 hematocrit 34.0 platelet count 250, TSH 2.630, triglycerides 190, total cholesterol 177     Comprehensive Metabolic Panel (06/13/2023 10:03)  CBC (No Diff) (06/13/2023 10:03)  Protime-INR (06/13/2023 10:03)  Iron Profile (06/13/2023 10:03)  Ferritin (06/13/2023 10:03)  BETTINA (06/13/2023 10:03)  Anti-Smooth Muscle Antibody Titer (06/13/2023 10:03)  Mitochondrial Antibodies, M2 (06/13/2023 10:03)  Ceruloplasmin (06/13/2023 10:03)  Hepatitis A Antibody, Total (06/13/2023 10:03)  Hepatitis B Surf Antibody Quant (06/13/2023 10:03)  Hepatitis B Surface Antigen (06/13/2023 10:03)  Hepatitis B Core Antibody, Total (06/13/2023 10:03)  Hepatitis C Antibody (06/13/2023 10:03)  GREER Fibrosure Plus (06/13/2023 10:03)    Abdominal ultrasound limited 2/2/2023  Fatty infiltration of the liver.   The gallbladder is not identified.      Colonoscopy dated 11/16/2016 per Dr. Grimaldo  - Scant diverticular change in the  left colon.    - Small diminutive colon polyps.    - Internal and small external hemorrhoids.    - No endoscopic evidence of ileitis or colitis was seen.  Random biopsies were obtained from the colon upon withdrawal of the scope.    - Small bowel terminal ileum biopsy reveals preserved villous architecture with no significant histopathologic change.  No parasites noted.  No villous blunting or increased intraepithelial lymphocytes suggestive of celiac disease.  Random colon biopsies revealed colonic mucosa with no significant histopathologic change.  No histologic features of acute colitis, chronic colitis, collagenous colitis or lymphocytic colitis identified.  Rectal polyp biopsy reveals hyperplastic polyp.     Colonoscopy 8/31/2022 per Dr. Reyna Montenegro, surgical services  - The ileocecal valve and appendiceal orifice were identified. There was no   Sign whatsoever of any polyps, tumors, or inflammatory bowel disease. I did not   See any diverticula. We did do random biopsies of the right and left colon for  microscopic colitis as she had noted that her diarrhea was increasing. She   As well had two internal hemorrhoids. The patient tolerated the procedure well  and was returned to the recovery room in good condition.   -Right colon biopsy with no significant histopathologic abnormality.  Left colon biopsy with no significant histopathologic abnormality.    EGD dated 8/15/2023 per Dr. Fiore  - Normal oropharynx.  - Z-line regular, 36 cm from the incisors.  - No gross lesions in the entire esophagus.  - Mild Erythematous mucosa in the antrum and prepyloric region of the stomach. Biopsied.  - A few gastric polyps. One resected and retrieved.  - Normal duodenal bulb, first portion of the duodenum, second portion of the duodenum and third portion of the duodenum. Biopsied.  A.     DUODENUM, BIOPSY:  Duodenal type mucosa with no significant histopathologic abnormalities  Negative for Celiac disease,  metaplasia, microorganisms or atypia  B.     ANTRUM AND BODY, BIOPSY:  Gastric mucosa without pathologic alterations  Negative for H. pylori, metaplasia or dysplasia  C.     STOMACH, BODY, POLYP:  Fundic gland polyp  Negative for H. pylori, metaplasia , dysplasia or malignancy     Assessment / Plan      1. Diarrhea, unspecified type  2. Irritable bowel syndrome with diarrhea  She has a longstanding history of diarrhea, no significant abdominal pain at this time.  She recently took a couple of rounds of antibiotics after dog bite and diarrhea has worsened.  Denies any rectal bleeding.  Colonoscopy in August 2022 unremarkable, right colon and left colon biopsies unremarkable.  Suspect IBS-D at baseline.  Will obtain stool studies.  Labs  Continue WelChol daily.  May take Imodium as needed.  Low FODMAP diet.    - Gastrointestinal Panel, PCR - Stool, Per Rectum; Future  - Clostridioides difficile Toxin, PCR - Stool, Per Rectum; Future  - Calprotectin, Fecal - Stool, Per Rectum; Future  - Pancreatic Elastase, Fecal - Stool, Per Rectum; Future  - CBC (No Diff); Future  - Comprehensive Metabolic Panel; Future  - TSH; Future  - IgA; Future  - Tissue Transglutaminase, IgA; Future    3. Gastroesophageal reflux disease without esophagitis  Reflux reasonably controlled with omeprazole 40 mg daily.  Continue same for now.  No difficulty swallowing.  EGD dated 8/15/2023 unremarkable, no Talley's.  Biopsies unremarkable.  Antireflux measures.  Try to decrease omeprazole to 20 mg daily in the future.    4. Anemia, unspecified type  Patient denies any GI bleeding. Labs in June 2023 with no anemia, normal hemoglobin and hematocrit.  Normal iron panel.  Colonoscopy and EGD with no cause for anemia.  Will monitor for now.    5. GREER (nonalcoholic steatohepatitis)  N2/S2-S3/F0-F1  6. Class 1 obesity due to excess calories with serious comorbidity and body mass index (BMI) of 30.0 to 30.9 in adult  BMI 30.55  She has lost some weight  since her last visit by changing her diet and cutting back on red meat. Labs dated 6/13/2023 with mildly elevated AST and ALT.  Normal alkaline phosphatase and total bilirubin.  CBC unremarkable.  She is not immune to hepatitis A or hepatitis B.  She is negative for hepatitis B and hepatitis C.  PT/INR normal.  Iron panel normal.  Ferritin normal.  BETTINA negative.  Smooth muscle antibody normal.  Mitochondrial antibody normal.  Ceruloplasmin normal.  FibroSure with Smith N2/S2-S3/F0-F1.  Triglycerides 238. Abdominal ultrasound dated 2/2/2023 with fatty liver noted.  Continue low-fat diet, exercise and weight reduction.  Patient needs vaccines for hepatitis A and hepatitis B which may be obtained through the health department or PCP office.  May need to be started on fenofibrate in addition to her Pravachol if triglycerides continue to be elevated. Lipid panel is monitored through PCP office.    - CBC (No Diff); Future  - Comprehensive Metabolic Panel; Future  - Protime-INR; Future    7. Encounter for screening for malignant neoplasm of colon  Colonoscopy in August 2022 by Dr. Montenegro in [facility] with no polyps removed.  Left and right colon biopsies unremarkable.  She was recommended colonoscopy in 5 years at that time.  Colonoscopy in August 2027 or sooner if needed.    Patient Instructions   Antireflux measures: Avoid fried, fatty foods, alcohol, chocolate, coffee, tea,  soft drinks, peppermint and spearmint, spicy foods, tomatoes and tomato based foods, onion based foods, and smoking.  Other antireflux measures include weight reduction if overweight, avoiding tight clothing around the abdomen, elevating the head of the bed 6 inches with blocks under the head board, and don't drink or eat before going to bed and avoid lying down immediately after meals.  Omeprazole 40 mg 1 po daily in the am 30 minutes before breakfast.  Recommended to take Levothyroxine first in the am upon waking, wait 30 minutes, then take  Omeprazole 40 mg, wait 30 minutes, then eat breakfast and take other medications.  High fiber, low fat diet with liberal water intake.   Low FODMAP diet - avoid all dairy. May use lactose free/dairy free alternatives such as almond milk, rice milk, oat milk, etc.   Continue Welcol tablets daily for diarrhea.   May take Imodium as needed.   Advised to exercise 30 minutes 4-5 days per week.   Advised to lose 10-15 pounds in the next 6-12 months.  The patient is not immune to hepatitis A or hepatitis B and needs vaccines which may be obtained through the health department or PCP office.  Labs  Stool studies  Colonoscopy for screening in 2027 or sooner if needed.   Follow up: 6 months or sooner if needed      Low-FODMAP Eating Plan    FODMAP stands for fermentable oligosaccharides, disaccharides, monosaccharides, and polyols. These are sugars that are hard for some people to digest. A low-FODMAP eating plan may help some people who have irritable bowel syndrome (IBS) and certain other bowel (intestinal) diseases to manage their symptoms.  This meal plan can be complicated to follow. Work with a diet and nutrition specialist (dietitian) to make a low-FODMAP eating plan that is right for you. A dietitian can help make sure that you get enough nutrition from this diet.  What are tips for following this plan?  Reading food labels  Check labels for hidden FODMAPs such as:  High-fructose syrup.  Honey.  Agave.  Natural fruit flavors.  Onion or garlic powder.  Choose low-FODMAP foods that contain 3-4 grams of fiber per serving.  Check food labels for serving sizes. Eat only one serving at a time to make sure FODMAP levels stay low.  Shopping  Shop with a list of foods that are recommended on this diet and make a meal plan.  Meal planning  Follow a low-FODMAP eating plan for up to 6 weeks, or as told by your health care provider or dietitian.  To follow the eating plan:  Eliminate high-FODMAP foods from your diet completely.  Choose only low-FODMAP foods to eat. You will do this for 2-6 weeks.  Gradually reintroduce high-FODMAP foods into your diet one at a time. Most people should wait a few days before introducing the next new high-FODMAP food into their meal plan. Your dietitian can recommend how quickly you may reintroduce foods.  Keep a daily record of what and how much you eat and drink. Make note of any symptoms that you have after eating.  Review your daily record with a dietitian regularly to identify which foods you can eat and which foods you should avoid.  General tips  Drink enough fluid each day to keep your urine pale yellow.  Avoid processed foods. These often have added sugar and may be high in FODMAPs.  Avoid most dairy products, whole grains, and sweeteners.  Work with a dietitian to make sure you get enough fiber in your diet.  Avoid high FODMAP foods at meals to manage symptoms.    Recommended foods  Fruits  Bananas, oranges, tangerines, jamison, limes, blueberries, raspberries, strawberries, grapes, cantaloupe, honeydew melon, kiwi, papaya, passion fruit, and pineapple. Limited amounts of dried cranberries, banana chips, and shredded coconut.  Vegetables  Eggplant, zucchini, cucumber, peppers, green beans, bean sprouts, lettuce, arugula, kale, Swiss chard, spinach, maynor greens, bok fabiola, summer squash, potato, and tomato. Limited amounts of corn, carrot, and sweet potato. Green parts of scallions.  Grains  Gluten-free grains, such as rice, oats, buckwheat, quinoa, corn, polenta, and millet. Gluten-free pasta, bread, or cereal. Rice noodles. Corn tortillas.  Meats and other proteins  Unseasoned beef, pork, poultry, or fish. Eggs. [place]in. Tofu (firm) and tempeh. Limited amounts of nuts and seeds, such as almonds, walnuts, brazil nuts, pecans, peanuts, nut butters, pumpkin seeds, gamaliel seeds, and sunflower seeds.  Dairy  Lactose-free milk, yogurt, and kefir. Lactose-free cottage cheese and ice cream. Non-dairy milks,  "such as almond, coconut, hemp, and rice milk. Non-dairy yogurt. Limited amounts of goat cheese, brie, mozzarella, parmesan, swiss, and other hard cheeses.  Fats and oils  Butter-free spreads. Vegetable oils, such as olive, canola, and sunflower oil.  Seasoning and other foods  Artificial sweeteners with names that do not end in \"ol,\" such as aspartame, saccharine, and stevia. Maple syrup, white table sugar, raw sugar, brown sugar, and molasses. Mayonnaise, soy sauce, and tamari. Fresh basil, coriander, parsley, rosemary, and thyme.  Beverages  Water and mineral water. Sugar-sweetened soft drinks. Small amounts of orange juice or cranberry juice. Black and green tea. Most dry faby. Coffee.  The items listed above may not be a complete list of foods and beverages you can eat. Contact a dietitian for more information.    Foods to avoid  Fruits  Fresh, dried, and juiced forms of apple, pear, watermelon, peach, plum, cherries, apricots, blackberries, boysenberries, figs, nectarines, and zuleyma. Avocado.  Vegetables  Chicory root, artichoke, asparagus, cabbage, snow peas, [place] sprouts, broccoli, sugar snap peas, mushrooms, celery, and cauliflower. Onions, garlic, leeks, and the white part of scallions.  Grains  Wheat, including kamut, durum, and semolina. Barley and bulgur. Couscous. Wheat-based cereals. Wheat noodles, bread, crackers, and pastries.  Meats and other proteins  Fried or fatty meat. Sausage. Cashews and pistachios. Soybeans, baked beans, black beans, chickpeas, kidney beans, fabiola beans, navy beans, lentils, black-eyed peas, and split peas.  Dairy  Milk, yogurt, ice cream, and soft cheese. Cream and sour cream. Milk-based sauces. Custard. Buttermilk. Soy milk.  Seasoning and other foods  Any sugar-free gum or candy. Foods that contain artificial sweeteners such as sorbitol, mannitol, isomalt, or xylitol. Foods that contain honey, high-fructose corn syrup, or agave. Otf, vegetable stock, beef stock, " and chicken stock. Garlic and onion powder. Condiments made with onion, such as hummus, chutney, pickles, relish, salad dressing, and salsa. Tomato paste.  Beverages  Chicory-based drinks. Coffee substitutes. Chamomile tea. Fennel tea. Sweet or fortified faby such as port or lu. Diet soft drinks made with isomalt, mannitol, maltitol, sorbitol, or xylitol. Apple, pear, and zuleyma juice. Juices with high-fructose corn syrup.  The items listed above may not be a complete list of foods and beverages you should avoid. Contact a dietitian for more information.    Summary  FODMAP stands for fermentable oligosaccharides, disaccharides, monosaccharides, and polyols. These are sugars that are hard for some people to digest.  A low-FODMAP eating plan is a short-term diet that helps to ease symptoms of certain bowel diseases.  The eating plan usually lasts up to 6 weeks. After that, high-FODMAP foods are reintroduced gradually and one at a time. This can help you find out which foods may be causing symptoms.  A low-FODMAP eating plan can be complicated. It is best to work with a dietitian who has experience with this type of plan.  This information is not intended to replace advice given to you by your health care provider. Make sure you discuss any questions you have with your health care provider.  Document Revised: 05/06/2021 Document Reviewed: 05/06/2021  [email or web address] Patient Education © 2023 [email or web address]    Demetrius High, APRN  11/27/2023    Please note that portions of this note were completed with a voice recognition program.

## 2023-11-28 LAB
DEPRECATED RDW RBC AUTO: 40 FL (ref 37–54)
ERYTHROCYTE [DISTWIDTH] IN BLOOD BY AUTOMATED COUNT: 13 % (ref 12.3–15.4)
HCT VFR BLD AUTO: 38.9 % (ref 34–46.6)
HGB BLD-MCNC: 13.4 G/DL (ref 12–15.9)
MCH RBC QN AUTO: 29.3 PG (ref 26.6–33)
MCHC RBC AUTO-ENTMCNC: 34.4 G/DL (ref 31.5–35.7)
MCV RBC AUTO: 85.1 FL (ref 79–97)
PLATELET # BLD AUTO: 226 10*3/MM3 (ref 140–450)
PMV BLD AUTO: 11.4 FL (ref 6–12)
RBC # BLD AUTO: 4.57 10*6/MM3 (ref 3.77–5.28)
WBC NRBC COR # BLD AUTO: 10.15 10*3/MM3 (ref 3.4–10.8)

## 2023-11-29 LAB — TTG IGA SER-ACNC: <2 U/ML (ref 0–3)

## 2023-12-11 LAB
ADV 40+41 DNA STL QL NAA+NON-PROBE: NOT DETECTED
ASTRO TYP 1-8 RNA STL QL NAA+NON-PROBE: NOT DETECTED
C CAYETANENSIS DNA STL QL NAA+NON-PROBE: NOT DETECTED
C COLI+JEJ+UPSA DNA STL QL NAA+NON-PROBE: NOT DETECTED
C DIFF TOX GENS STL QL NAA+PROBE: NOT DETECTED
CRYPTOSP DNA STL QL NAA+NON-PROBE: NOT DETECTED
E HISTOLYT DNA STL QL NAA+NON-PROBE: NOT DETECTED
EAEC PAA PLAS AGGR+AATA ST NAA+NON-PRB: NOT DETECTED
EC STX1+STX2 GENES STL QL NAA+NON-PROBE: NOT DETECTED
EPEC EAE GENE STL QL NAA+NON-PROBE: NOT DETECTED
ETEC LTA+ST1A+ST1B TOX ST NAA+NON-PROBE: NOT DETECTED
G LAMBLIA DNA STL QL NAA+NON-PROBE: NOT DETECTED
NOROVIRUS GI+II RNA STL QL NAA+NON-PROBE: NOT DETECTED
P SHIGELLOIDES DNA STL QL NAA+NON-PROBE: NOT DETECTED
RVA RNA STL QL NAA+NON-PROBE: NOT DETECTED
S ENT+BONG DNA STL QL NAA+NON-PROBE: NOT DETECTED
SAPO I+II+IV+V RNA STL QL NAA+NON-PROBE: NOT DETECTED
SHIGELLA SP+EIEC IPAH ST NAA+NON-PROBE: NOT DETECTED
V CHOL+PARA+VUL DNA STL QL NAA+NON-PROBE: NOT DETECTED
V CHOLERAE DNA STL QL NAA+NON-PROBE: NOT DETECTED
Y ENTEROCOL DNA STL QL NAA+NON-PROBE: NOT DETECTED

## 2023-12-11 PROCEDURE — 82653 EL-1 FECAL QUANTITATIVE: CPT

## 2023-12-11 PROCEDURE — 83993 ASSAY FOR CALPROTECTIN FECAL: CPT

## 2023-12-11 PROCEDURE — 87493 C DIFF AMPLIFIED PROBE: CPT

## 2023-12-11 PROCEDURE — 87507 IADNA-DNA/RNA PROBE TQ 12-25: CPT

## 2023-12-15 LAB — CALPROTECTIN STL-MCNT: 14 UG/G (ref 0–120)

## 2023-12-18 ENCOUNTER — TELEPHONE (OUTPATIENT)
Dept: GASTROENTEROLOGY | Facility: CLINIC | Age: 58
End: 2023-12-18
Payer: COMMERCIAL

## 2023-12-18 DIAGNOSIS — K86.89 PANCREATIC INSUFFICIENCY: Primary | ICD-10-CM

## 2023-12-18 LAB — ELASTASE PANC STL-MCNT: 98 UG ELAST./G

## 2023-12-18 RX ORDER — PANCRELIPASE 36000; 180000; 114000 [USP'U]/1; [USP'U]/1; [USP'U]/1
CAPSULE, DELAYED RELEASE PELLETS ORAL
Qty: 240 CAPSULE | Refills: 3 | Status: SHIPPED | OUTPATIENT
Start: 2023-12-18

## 2023-12-18 NOTE — TELEPHONE ENCOUNTER
----- Message from NORY Holland sent at 12/18/2023 11:59 AM EST -----  Let her know her stool studies showed her pancreatic enzymes are a little low. I have sent in Creon, which are digestive enzymes, for her to take 2 capsules with every meal and 1 capsule with every snack. She can continue to take the Welchol daily and Imodium as needed. The other stool studies are ok. We will repeat that stool study in 6 months.

## 2024-04-19 ENCOUNTER — TELEPHONE (OUTPATIENT)
Dept: GASTROENTEROLOGY | Facility: CLINIC | Age: 59
End: 2024-04-19
Payer: COMMERCIAL

## 2024-04-19 NOTE — TELEPHONE ENCOUNTER
Patient called and said that she feels like the Creon is making her nauseated and that it is causing low blood sugars. Pt would like to know if there is anything she can do. I let her know you were out until Monday and if she feels worse to go to the ER. Pt said she would be ok to wait until Monday.

## 2024-04-22 NOTE — TELEPHONE ENCOUNTER
I can order a CT scan if her abdominal pain worsens or does not improve. If her symptoms are severe or getting worse, she needs to go to the ER as they can get STAT CT scans if indicated. If I order, it may be several weeks before they can get her approved and scheduled.

## 2024-04-22 NOTE — TELEPHONE ENCOUNTER
She can stop the Creon. Is she taking the Welchol once a day or twice a day? If she is only taking it once a day, increase to twice a day. She can also take Imodium as needed for breakthrough diarrhea. She can take Gas-X with meals as needed for gas.

## 2024-05-29 ENCOUNTER — OFFICE VISIT (OUTPATIENT)
Dept: GASTROENTEROLOGY | Facility: CLINIC | Age: 59
End: 2024-05-29
Payer: COMMERCIAL

## 2024-05-29 VITALS
WEIGHT: 179 LBS | SYSTOLIC BLOOD PRESSURE: 122 MMHG | BODY MASS INDEX: 30.56 KG/M2 | HEIGHT: 64 IN | HEART RATE: 84 BPM | RESPIRATION RATE: 14 BRPM | DIASTOLIC BLOOD PRESSURE: 88 MMHG | OXYGEN SATURATION: 96 %

## 2024-05-29 DIAGNOSIS — K21.9 GASTROESOPHAGEAL REFLUX DISEASE WITHOUT ESOPHAGITIS: Chronic | ICD-10-CM

## 2024-05-29 DIAGNOSIS — E66.09 CLASS 1 OBESITY DUE TO EXCESS CALORIES WITH SERIOUS COMORBIDITY AND BODY MASS INDEX (BMI) OF 30.0 TO 30.9 IN ADULT: Chronic | ICD-10-CM

## 2024-05-29 DIAGNOSIS — K58.0 IRRITABLE BOWEL SYNDROME WITH DIARRHEA: Chronic | ICD-10-CM

## 2024-05-29 DIAGNOSIS — K86.89 PANCREATIC INSUFFICIENCY: Chronic | ICD-10-CM

## 2024-05-29 DIAGNOSIS — K75.81 NASH (NONALCOHOLIC STEATOHEPATITIS): Chronic | ICD-10-CM

## 2024-05-29 DIAGNOSIS — Z12.11 ENCOUNTER FOR SCREENING FOR MALIGNANT NEOPLASM OF COLON: ICD-10-CM

## 2024-05-29 DIAGNOSIS — R19.7 DIARRHEA, UNSPECIFIED TYPE: Primary | Chronic | ICD-10-CM

## 2024-05-29 PROBLEM — D64.9 ANEMIA: Status: RESOLVED | Noted: 2023-06-13 | Resolved: 2024-05-29

## 2024-05-29 PROBLEM — E66.811 CLASS 1 OBESITY DUE TO EXCESS CALORIES WITH SERIOUS COMORBIDITY AND BODY MASS INDEX (BMI) OF 30.0 TO 30.9 IN ADULT: Chronic | Status: ACTIVE | Noted: 2024-05-29

## 2024-05-29 PROCEDURE — 99214 OFFICE O/P EST MOD 30 MIN: CPT | Performed by: NURSE PRACTITIONER

## 2024-05-29 NOTE — PROGRESS NOTES
"     Follow Up Note     Date: 2024   Patient Name: Meryl Perla  MRN: [id number]  : 1965     Primary Care Provider: Yoanna Valle APRN     Chief Complaint   Patient presents with    Diarrhea     History of present illness:   2024  Meryl Perla is a 58 y.o. female who is here today for follow up for diarrhea. She has been doing better. She went to the ER about a month ago as she was having an episode of diarrhea and abdominal pain, but was told labs and CT scan was ok. Symptoms have now improved.  She is taking Welchol once a day and diarrhea is reasonably controlled most of the time, still has some breakthrough occasionally. She tried to take the Creon, but it caused her to have blood sugar drops and she did not feel well while taking it. She has since stopped it.  No GI bleeding.     Interval History:  2023  Meryl Perla is a 58 y.o. female who is here today for follow up after EGD. Reflux is doing ok at this time. Diarrhea has worsened after taking antibiotics for a dog bite. She has had 2-3 rounds of antibiotics. She is having to take up to 6 Welchol tablets per day to slow down diarrhea. No significant abdominal pain. Denies rectal bleeding.     2023  Meryl Perla is a 57 y.o. female who is here today to establish care with gastroenterology for elevated liver enzymes.      She has a history of elevated liver enzymes for the past 5-6 years. She denies personal or family history of liver disease. There is no history of IVDA, alcohol use, tattoos or blood transfusions.      She has a history of anemia for the past year or so. There is no history of GI bleeding, denies hematemesis, melena or hematochezia. There is no history of hematuria or vaginal bleeding.      She has a history of reflux for many years that is reasonably controlled with Omeprazole 40 mg daily. No difficulty swallowing. Denies nausea or vomiting.      She has a history of \"bile acid diarrhea\" for many " "years that is reasonably controlled with Welchol tablet 1-3 times per day. She usually has 1-2 soft bowel movements per day, occasionally has 3. She has occasional abdominal pain associated with bowel movements if having diarrhea, maybe 1-2 times per week.      Her last colonoscopy was in 2022 by Dr. Montenegro in [place], [place]. Her last EGD was 7-8 years ago. Her grandmother had stomach cancer. No family history of colon cancer.    Subjective      Past Medical History:   Diagnosis Date    Anxiety     Asthma     Back pain     Bronchitis     \"in the past\"    Colon polyp 2016    Depression 1982    Diabetes mellitus 2015    Elevated liver enzymes     Fatty liver     Fibromyalgia     GERD (gastroesophageal reflux disease)     Heart murmur     \"younger\"    Hyperlipidemia     Hypertension 2012    Hypothyroidism     Migraine     Osteoarthritis     Pneumonia     \"in the past\"    RLS (restless legs syndrome)     Sinus problem     Snores      Past Surgical History:   Procedure Laterality Date     SECTION  ,     CHOLECYSTECTOMY  2006    gallstones    COLONOSCOPY      COLONOSCOPY      COLONOSCOPY  2016    ENDOSCOPY N/A 8/15/2023    Procedure: ESOPHAGOGASTRODUODENOSCOPY WITH BIOPSY AND COLD BIOPSY POLYPECTOMY;  Surgeon: Xin Fiore MD;  Location: [facility] ENDOSCOPY;  Service: Gastroenterology;  Laterality: N/A;    HYSTERECTOMY      Partial hysty    OVARY SURGERY      removal of ovary    UPPER GASTROINTESTINAL ENDOSCOPY      UPPER GASTROINTESTINAL ENDOSCOPY      WISDOM TOOTH EXTRACTION       Family History   Problem Relation Age of Onset    Hypertension Mother     Stroke Mother     Mental illness Mother     Diabetes Father     Hypertension Father     Cancer Other     Stomach cancer Maternal Grandmother     Colon cancer Neg Hx     Ulcerative colitis Neg Hx     Esophageal cancer Neg Hx     Liver cancer Neg Hx     Liver disease Neg Hx  " "    Social History     Socioeconomic History    Marital status:    Tobacco Use    Smoking status: Never    Smokeless tobacco: Never   Vaping Use    Vaping status: Never Used   Substance and Sexual Activity    Alcohol use: No     Comment: Former use; quit in 1989    Drug use: No    Sexual activity: Defer       Current Outpatient Medications:     aspirin 81 MG EC tablet, Take 1 tablet by mouth Every Night., Disp: , Rfl:     dicyclomine (BENTYL) 10 MG capsule, TK 1 C PO TID, Disp: , Rfl: 5    Emollient (CERAVE) lotion, , Disp: , Rfl: 0    glipiZIDE (GLUCOTROL) 5 MG ER tablet, Take 2 tablets by mouth 2 (Two) Times a Day., Disp: , Rfl: 3    levothyroxine (SYNTHROID, LEVOTHROID) 88 MCG tablet, Take 1 tablet by mouth Daily., Disp: , Rfl:     metFORMIN (GLUCOPHAGE) 500 MG tablet, Take 2 tablets by mouth 2 (Two) Times a Day., Disp: , Rfl:     montelukast (SINGULAIR) 10 MG tablet, Take 1 tablet by mouth Daily., Disp: , Rfl:     omeprazole (PriLOSEC) 40 MG capsule, TK ONE C PO QD BEFORE A MEAL, Disp: , Rfl: 1    pravastatin (PRAVACHOL) 20 MG tablet, Take 2 tablets by mouth Every Night., Disp: , Rfl:     pregabalin (LYRICA) 100 MG capsule, pregabalin 100 mg capsule  TAKE 1 CAPSULE BY MOUTH TWICE DAILY, Disp: , Rfl:     SITagliptin (JANUVIA) 100 MG tablet, Take 1 tablet by mouth Daily., Disp: , Rfl:     Trulicity 3 MG/0.5ML solution pen-injector, , Disp: , Rfl:     WELCHOL 625 MG tablet, , Disp: , Rfl: 5    Allergies   Allergen Reactions    Cefuroxime Axetil Hives    Cephalexin Hives    Ceftin [Cefuroxime] Hives    Erythromycin Diarrhea and GI Intolerance     \"Severe bloating, diarrhea, and heartburn.\"    Adhesive Tape Other (See Comments)     \"Blister\"     The following portions of the patient's history were reviewed and updated as appropriate: allergies, current medications, past family history, past medical history, past social history, past surgical history and problem list.  Objective     Physical Exam  Vitals and " "nursing note reviewed.   Constitutional:       General: She is not in acute distress.     Appearance: Normal appearance. She is well-developed.   HENT:      Head: Normocephalic and atraumatic.      Mouth/Throat:      Mouth: Mucous membranes are not pale, not dry and not cyanotic.   Eyes:      General: Lids are normal.   Neck:      Trachea: Trachea normal.   Cardiovascular:      Rate and Rhythm: Normal rate.   Pulmonary:      Effort: Pulmonary effort is normal. No respiratory distress.      Breath sounds: Normal breath sounds.   Abdominal:      Tenderness: There is no abdominal tenderness.   Skin:     General: Skin is warm and dry.   Neurological:      Mental Status: She is alert and oriented to person, place, and time.   Psychiatric:         Mood and Affect: Mood normal.         Speech: Speech normal.         Behavior: Behavior normal. Behavior is cooperative.       Vitals:    05/29/24 1612   BP: 122/88   Pulse: 84   Resp: 14   SpO2: 96%   Weight: 81.2 kg (179 lb)   Height: 162.6 cm (64\")     Body mass index is 30.73 kg/m².     Results Review:   I reviewed the patient's new clinical results.    No visits with results within 90 Day(s) from this visit.   Latest known visit with results is:   Lab on 11/27/2023   Component Date Value Ref Range Status    Glucose 11/27/2023 118 (H)  65 - 99 mg/dL Final    BUN 11/27/2023 23 (H)  6 - 20 mg/dL Final    Creatinine 11/27/2023 1.49 (H)  0.57 - 1.00 mg/dL Final    Sodium 11/27/2023 139  136 - 145 mmol/L Final    Potassium 11/27/2023 4.5  3.5 - 5.2 mmol/L Final    Chloride 11/27/2023 101  98 - 107 mmol/L Final    CO2 11/27/2023 22.0  22.0 - 29.0 mmol/L Final    Calcium 11/27/2023 10.2  8.6 - 10.5 mg/dL Final    Total Protein 11/27/2023 7.9  6.0 - 8.5 g/dL Final    Albumin 11/27/2023 5.2  3.5 - 5.2 g/dL Final    ALT (SGPT) 11/27/2023 34 (H)  1 - 33 U/L Final    AST (SGOT) 11/27/2023 32  1 - 32 U/L Final    Alkaline Phosphatase 11/27/2023 72  39 - 117 U/L Final    Total Bilirubin " 11/27/2023 0.4  0.0 - 1.2 mg/dL Final    Globulin 11/27/2023 2.7  gm/dL Final    A/G Ratio 11/27/2023 1.9  g/dL Final    BUN/Creatinine Ratio 11/27/2023 15.4  7.0 - 25.0 Final    Anion Gap 11/27/2023 16.0 (H)  5.0 - 15.0 mmol/L Final    eGFR 11/27/2023 40.6 (L)  >60.0 mL/min/1.73 Final    WBC 11/27/2023 10.15  3.40 - 10.80 10*3/mm3 Final    RBC 11/27/2023 4.57  3.77 - 5.28 10*6/mm3 Final    Hemoglobin 11/27/2023 13.4  12.0 - 15.9 g/dL Final    Hematocrit 11/27/2023 38.9  34.0 - 46.6 % Final    MCV 11/27/2023 85.1  79.0 - 97.0 fL Final    MCH 11/27/2023 29.3  26.6 - 33.0 pg Final    MCHC 11/27/2023 34.4  31.5 - 35.7 g/dL Final    RDW 11/27/2023 13.0  12.3 - 15.4 % Final    RDW-SD 11/27/2023 40.0  37.0 - 54.0 fl Final    MPV 11/27/2023 11.4  6.0 - 12.0 fL Final    Platelets 11/27/2023 226  140 - 450 10*3/mm3 Final    Protime 11/27/2023 13.3  12.3 - 15.1 Seconds Final    INR 11/27/2023 0.96  0.90 - 1.10 Final    TSH 11/27/2023 3.990  0.270 - 4.200 uIU/mL Final    IgA 11/27/2023 246  70 - 400 mg/dL Final    Tissue Transglutaminase IgA 11/27/2023 <2  0 - 3 U/mL Final    Pancreatic Fecal 12/11/2023 98 (L)  >200 ug Elast./g Final    Calprotectin, Fecal 12/11/2023 14  0 - 120 ug/g Final    Toxigenic C. difficile by PCR 12/11/2023 Not Detected  Not Detected Final    Campylobacter 12/11/2023 Not Detected  Not Detected Final    Plesiomonas shigelloides 12/11/2023 Not Detected  Not Detected Final    Salmonella 12/11/2023 Not Detected  Not Detected Final    Vibrio 12/11/2023 Not Detected  Not Detected Final    Vibrio cholerae 12/11/2023 Not Detected  Not Detected Final    Yersinia enterocolitica 12/11/2023 Not Detected  Not Detected Final    Enteroaggregative E. coli (EAEC) 12/11/2023 Not Detected  Not Detected Final    Enteropathogenic E. coli (EPEC) 12/11/2023 Not Detected  Not Detected Final    Enterotoxigenic E. coli (ETEC) lt/* 12/11/2023 Not Detected  Not Detected Final    Shiga-like toxin-producing E. coli* 12/11/2023 Not  Detected  Not Detected Final    Shigella/Enteroinvasive E. coli (E* 12/11/2023 Not Detected  Not Detected Final    Cryptosporidium 12/11/2023 Not Detected  Not Detected Final    Cyclospora cayetanensis 12/11/2023 Not Detected  Not Detected Final    Entamoeba histolytica 12/11/2023 Not Detected  Not Detected Final    Giardia lamblia 12/11/2023 Not Detected  Not Detected Final    Adenovirus F40/41 12/11/2023 Not Detected  Not Detected Final    Astrovirus 12/11/2023 Not Detected  Not Detected Final    Norovirus GI/GII 12/11/2023 Not Detected  Not Detected Final    Rotavirus A 12/11/2023 Not Detected  Not Detected Final    Sapovirus (I, II, IV or V) 12/11/2023 Not Detected  Not Detected Final      Dated 4/7/2022 albumin 4.6 alkaline phosphatase 93 ALT 36 AST 46 total bilirubin <0.2 hemoglobin 10.4 hematocrit 34.0 platelet count 250, TSH 2.630, triglycerides 190, total cholesterol 177     Comprehensive Metabolic Panel (06/13/2023 10:03)  CBC (No Diff) (06/13/2023 10:03)  Protime-INR (06/13/2023 10:03)  Iron Profile (06/13/2023 10:03)  Ferritin (06/13/2023 10:03)  BETTINA (06/13/2023 10:03)  Anti-Smooth Muscle Antibody Titer (06/13/2023 10:03)  Mitochondrial Antibodies, M2 (06/13/2023 10:03)  Ceruloplasmin (06/13/2023 10:03)  Hepatitis A Antibody, Total (06/13/2023 10:03)  Hepatitis B Surf Antibody Quant (06/13/2023 10:03)  Hepatitis B Surface Antigen (06/13/2023 10:03)  Hepatitis B Core Antibody, Total (06/13/2023 10:03)  Hepatitis C Antibody (06/13/2023 10:03)  GREER Fibrosure Plus (06/13/2023 10:03)     COMPREHENSIVE METABOLIC PANEL (04/22/2024 20:37)  CBC with Auto Diff (04/22/2024 20:37)  LIPASE (04/22/2024 20:37)    Abdominal ultrasound limited 2/2/2023  Fatty infiltration of the liver.   The gallbladder is not identified.     CT Abdomen Pelvis With Contrast     Result Date: 4/22/2024  No acute findings in the abdomen or pelvis to account for the patient's symptoms.     Colonoscopy dated 11/16/2016 per Dr. Dillan Silva  diverticular change in the left colon.    - Small diminutive colon polyps.    - Internal and small external hemorrhoids.    - No endoscopic evidence of ileitis or colitis was seen.  Random biopsies were obtained from the colon upon withdrawal of the scope.    - Small bowel terminal ileum biopsy reveals preserved villous architecture with no significant histopathologic change.  No parasites noted.  No villous blunting or increased intraepithelial lymphocytes suggestive of celiac disease.  Random colon biopsies revealed colonic mucosa with no significant histopathologic change.  No histologic features of acute colitis, chronic colitis, collagenous colitis or lymphocytic colitis identified.  Rectal polyp biopsy reveals hyperplastic polyp.     Colonoscopy 8/31/2022 per Dr. Reyna Montenegro, surgical services  - The ileocecal valve and appendiceal orifice were identified. There was no   Sign whatsoever of any polyps, tumors, or inflammatory bowel disease. I did not   See any diverticula. We did do random biopsies of the right and left colon for  microscopic colitis as she had noted that her diarrhea was increasing. She   As well had two internal hemorrhoids. The patient tolerated the procedure well  and was returned to the recovery room in good condition.   -Right colon biopsy with no significant histopathologic abnormality.  Left colon biopsy with no significant histopathologic abnormality.     EGD dated 8/15/2023 per Dr. Fiore  - Normal oropharynx.  - Z-line regular, 36 cm from the incisors.  - No gross lesions in the entire esophagus.  - Mild Erythematous mucosa in the antrum and prepyloric region of the stomach. Biopsied.  - A few gastric polyps. One resected and retrieved.  - Normal duodenal bulb, first portion of the duodenum, second portion of the duodenum and third portion of the duodenum. Biopsied.  A.     DUODENUM, BIOPSY:  Duodenal type mucosa with no significant histopathologic abnormalities  Negative  for Celiac disease, metaplasia, microorganisms or atypia  B.     ANTRUM AND BODY, BIOPSY:  Gastric mucosa without pathologic alterations  Negative for H. pylori, metaplasia or dysplasia  C.     STOMACH, BODY, POLYP:  Fundic gland polyp  Negative for H. pylori, metaplasia , dysplasia or malignancy     Assessment / Plan      1. Diarrhea, unspecified type  2. Irritable bowel syndrome with diarrhea  3. Pancreatic insufficiency  Diarrhea reasonably controlled with Welchol at this time. No significant abdominal pain at this time. No GI bleeding. Basic labs unremarkable. TSH normal.  Celiac panel normal.  Fecal calprotectin normal.  Stool for C. difficile negative.  Gastrointestinal panel negative.  Pancreatic elastase low at 98. CTAP 4/22/2024 with pancreas and GI tract unremarkable. She tried taking Creon, but felt her made her blood sugar drop and she has stopped it. Colonoscopy in 2016 unremarkable, TI and random colon biopsies unremarkable. Colonoscopy in August 2022 unremarkable, right colon and left colon biopsies unremarkable.  Suspect IBS-D at baseline.  Low FODMAP diet - avoid all dairy.   Continue Welchol daily  May take Imodium as needed for breakthrough diarrhea.   Consider repeating colonoscopy with TI and random colon biopsies if diarrhea worsens to rule out IBD/microscopic colitis.    4. Gastroesophageal reflux disease without esophagitis  Reflux reasonably controlled with Omeprazole 40 mg daily. Continue same for now. No difficulty swallowing. EGD dated 8/15/2023 unremarkable, no Talley's. Biopsies unremarkable.   Anti-reflux measures.    5. GREER (nonalcoholic steatohepatitis)  6. Class 1 obesity due to excess calories with serious comorbidity and body mass index (BMI) of 30.0 to 30.9 in adult  BMI 30.73  Recent labs with liver enzymes normal. Abdominal ultrasound dated 2/2/2023 with fatty liver noted.  CTAP 4/22/2024 with liver unremarkable. She is negative for hepatitis B and hepatitis C. PT/INR normal.  Iron panel normal. Ferritin normal. BETTINA negative. Smooth muscle antibody normal. Mitochondrial antibody normal. Ceruloplasmin normal. FibroSure with Smith N2/S2-S3/F0-F1.   Recommend low-fat diet, exercise and weight reduction.  May need to be started on fenofibrate in addition to her Pravachol if triglycerides continue to be elevated. Lipid panel is monitored through PCP office.    7. Encounter for screening for malignant neoplasm of colon  Colonoscopy in August 2022 by Dr. Montenegro in [facility] with no polyps removed.  Left and right colon biopsies unremarkable.  She was recommended colonoscopy in 5 years at that time.  Colonoscopy in August 2027 or sooner if needed.    Patient Instructions   Antireflux measures: Avoid fried, fatty foods, alcohol, chocolate, coffee, tea,  soft drinks, peppermint and spearmint, spicy foods, tomatoes and tomato based foods, onion based foods, and smoking.  Other antireflux measures include weight reduction if overweight, avoiding tight clothing around the abdomen, elevating the head of the bed 6 inches with blocks under the head board, and don't drink or eat before going to bed and avoid lying down immediately after meals.  Omeprazole 40 mg 1 po daily in the am 30 minutes before breakfast.  Recommended to take Levothyroxine first in the am upon waking, wait 30 minutes, then take Omeprazole 40 mg, wait 30 minutes, then eat breakfast and take other medications.  High fiber, low fat diet with liberal water intake.   Low FODMAP diet - avoid all dairy. May use lactose free/dairy free alternatives such as almond milk, rice milk, oat milk, etc.   Continue Welcol tablets daily for diarrhea.   May take Imodium as needed for breakthrough diarrhea.   Bentyl 10 mg 1 po 3 times a day as needed for abdominal cramping.   Advised to exercise 30 minutes 4-5 days per week.   Advised to lose 10-15 pounds in the next 6-12 months.  The patient is not immune to hepatitis A or hepatitis B and needs  vaccines which may be obtained through the health department or PCP office if not already undertaken.  Colonoscopy for screening in 2027 or sooner if needed.   Follow up: 6 months        Low-FODMAP Eating Plan    FODMAP stands for fermentable oligosaccharides, disaccharides, monosaccharides, and polyols. These are sugars that are hard for some people to digest. A low-FODMAP eating plan may help some people who have irritable bowel syndrome (IBS) and certain other bowel (intestinal) diseases to manage their symptoms.  This meal plan can be complicated to follow. Work with a diet and nutrition specialist (dietitian) to make a low-FODMAP eating plan that is right for you. A dietitian can help make sure that you get enough nutrition from this diet.  What are tips for following this plan?  Reading food labels  Check labels for hidden FODMAPs such as:  High-fructose syrup.  Honey.  Agave.  Natural fruit flavors.  Onion or garlic powder.  Choose low-FODMAP foods that contain 3-4 grams of fiber per serving.  Check food labels for serving sizes. Eat only one serving at a time to make sure FODMAP levels stay low.  Shopping  Shop with a list of foods that are recommended on this diet and make a meal plan.  Meal planning  Follow a low-FODMAP eating plan for up to 6 weeks, or as told by your health care provider or dietitian.  To follow the eating plan:  Eliminate high-FODMAP foods from your diet completely. Choose only low-FODMAP foods to eat. You will do this for 2-6 weeks.  Gradually reintroduce high-FODMAP foods into your diet one at a time. Most people should wait a few days before introducing the next new high-FODMAP food into their meal plan. Your dietitian can recommend how quickly you may reintroduce foods.  Keep a daily record of what and how much you eat and drink. Make note of any symptoms that you have after eating.  Review your daily record with a dietitian regularly to identify which foods you can eat and which  "foods you should avoid.  General tips  Drink enough fluid each day to keep your urine pale yellow.  Avoid processed foods. These often have added sugar and may be high in FODMAPs.  Avoid most dairy products, whole grains, and sweeteners.  Work with a dietitian to make sure you get enough fiber in your diet.  Avoid high FODMAP foods at meals to manage symptoms.     Recommended foods  Fruits  Bananas, oranges, tangerines, jamison, limes, blueberries, raspberries, strawberries, grapes, cantaloupe, honeydew melon, kiwi, papaya, passion fruit, and pineapple. Limited amounts of dried cranberries, banana chips, and shredded coconut.  Vegetables  Eggplant, zucchini, cucumber, peppers, green beans, bean sprouts, lettuce, arugula, kale, Swiss chard, spinach, maynor greens, bok fabiola, summer squash, potato, and tomato. Limited amounts of corn, carrot, and sweet potato. Green parts of scallions.  Grains  Gluten-free grains, such as rice, oats, buckwheat, quinoa, corn, polenta, and millet. Gluten-free pasta, bread, or cereal. Rice noodles. Corn tortillas.  Meats and other proteins  Unseasoned beef, pork, poultry, or fish. Eggs. [place]. Tofu (firm) and tempeh. Limited amounts of nuts and seeds, such as almonds, walnuts, brazil nuts, pecans, peanuts, nut butters, pumpkin seeds, gamaliel seeds, and sunflower seeds.  Dairy  Lactose-free milk, yogurt, and kefir. Lactose-free cottage cheese and ice cream. Non-dairy milks, such as almond, coconut, hemp, and rice milk. Non-dairy yogurt. Limited amounts of goat cheese, brie, mozzarella, parmesan, swiss, and other hard cheeses.  Fats and oils  Butter-free spreads. Vegetable oils, such as olive, canola, and sunflower oil.  Seasoning and other foods  Artificial sweeteners with names that do not end in \"ol,\" such as aspartame, saccharine, and stevia. Maple syrup, white table sugar, raw sugar, brown sugar, and molasses. Mayonnaise, soy sauce, and tamari. Fresh basil, coriander, parsley, rosemary, " and thyme.  Beverages  Water and mineral water. Sugar-sweetened soft drinks. Small amounts of orange juice or cranberry juice. Black and green tea. Most dry faby. Coffee.  The items listed above may not be a complete list of foods and beverages you can eat. Contact a dietitian for more information.     Foods to avoid  Fruits  Fresh, dried, and juiced forms of apple, pear, watermelon, peach, plum, cherries, apricots, blackberries, boysenberries, figs, nectarines, and zuleyma. Avocado.  Vegetables  Chicory root, artichoke, asparagus, cabbage, snow peas, [place] sprouts, broccoli, sugar snap peas, mushrooms, celery, and cauliflower. Onions, garlic, leeks, and the white part of scallions.  Grains  Wheat, including kamut, durum, and semolina. Barley and bulgur. Couscous. Wheat-based cereals. Wheat noodles, bread, crackers, and pastries.  Meats and other proteins  Fried or fatty meat. Sausage. Cashews and pistachios. Soybeans, baked beans, black beans, chickpeas, kidney beans, fabiola beans, navy beans, lentils, black-eyed peas, and split peas.  Dairy  Milk, yogurt, ice cream, and soft cheese. Cream and sour cream. Milk-based sauces. Custard. Buttermilk. Soy milk.  Seasoning and other foods  Any sugar-free gum or candy. Foods that contain artificial sweeteners such as sorbitol, mannitol, isomalt, or xylitol. Foods that contain honey, high-fructose corn syrup, or agave. Bouillon, vegetable stock, beef stock, and chicken stock. Garlic and onion powder. Condiments made with onion, such as hummus, chutney, pickles, relish, salad dressing, and salsa. Tomato paste.  Beverages  Chicory-based drinks. Coffee substitutes. Chamomile tea. Fennel tea. Sweet or fortified faby such as port or lu. Diet soft drinks made with isomalt, mannitol, maltitol, sorbitol, or xylitol. Apple, pear, and zuleyma juice. Juices with high-fructose corn syrup.  The items listed above may not be a complete list of foods and beverages you should avoid.  Contact a dietitian for more information.     Summary  FODMAP stands for fermentable oligosaccharides, disaccharides, monosaccharides, and polyols. These are sugars that are hard for some people to digest.  A low-FODMAP eating plan is a short-term diet that helps to ease symptoms of certain bowel diseases.  The eating plan usually lasts up to 6 weeks. After that, high-FODMAP foods are reintroduced gradually and one at a time. This can help you find out which foods may be causing symptoms.  A low-FODMAP eating plan can be complicated. It is best to work with a dietitian who has experience with this type of plan.  This information is not intended to replace advice given to you by your health care provider. Make sure you discuss any questions you have with your health care provider.  Document Revised: 05/06/2021 Document Reviewed: 05/06/2021  [employer] Patient Education © 2023 [employer]     Demetrius High, APRN  5/29/2024    Please note that portions of this note were completed with a voice recognition program.

## 2024-05-29 NOTE — PATIENT INSTRUCTIONS
Antireflux measures: Avoid fried, fatty foods, alcohol, chocolate, coffee, tea,  soft drinks, peppermint and spearmint, spicy foods, tomatoes and tomato based foods, onion based foods, and smoking.  Other antireflux measures include weight reduction if overweight, avoiding tight clothing around the abdomen, elevating the head of the bed 6 inches with blocks under the head board, and don't drink or eat before going to bed and avoid lying down immediately after meals.  Omeprazole 40 mg 1 po daily in the am 30 minutes before breakfast.  Recommended to take Levothyroxine first in the am upon waking, wait 30 minutes, then take Omeprazole 40 mg, wait 30 minutes, then eat breakfast and take other medications.  High fiber, low fat diet with liberal water intake.   Low FODMAP diet - avoid all dairy. May use lactose free/dairy free alternatives such as almond milk, rice milk, oat milk, etc.   Continue Welcol tablets daily for diarrhea.   May take Imodium as needed for breakthrough diarrhea.   Bentyl 10 mg 1 po 3 times a day as needed for abdominal cramping.   Advised to exercise 30 minutes 4-5 days per week.   Advised to lose 10-15 pounds in the next 6-12 months.  The patient is not immune to hepatitis A or hepatitis B and needs vaccines which may be obtained through the health department or PCP office if not already undertaken.  Colonoscopy for screening in 2027 or sooner if needed.   Follow up: 6 months        Low-FODMAP Eating Plan    FODMAP stands for fermentable oligosaccharides, disaccharides, monosaccharides, and polyols. These are sugars that are hard for some people to digest. A low-FODMAP eating plan may help some people who have irritable bowel syndrome (IBS) and certain other bowel (intestinal) diseases to manage their symptoms.  This meal plan can be complicated to follow. Work with a diet and nutrition specialist (dietitian) to make a low-FODMAP eating plan that is right for you. A dietitian can help make sure  that you get enough nutrition from this diet.  What are tips for following this plan?  Reading food labels  Check labels for hidden FODMAPs such as:  High-fructose syrup.  Honey.  Agave.  Natural fruit flavors.  Onion or garlic powder.  Choose low-FODMAP foods that contain 3-4 grams of fiber per serving.  Check food labels for serving sizes. Eat only one serving at a time to make sure FODMAP levels stay low.  Shopping  Shop with a list of foods that are recommended on this diet and make a meal plan.  Meal planning  Follow a low-FODMAP eating plan for up to 6 weeks, or as told by your health care provider or dietitian.  To follow the eating plan:  Eliminate high-FODMAP foods from your diet completely. Choose only low-FODMAP foods to eat. You will do this for 2-6 weeks.  Gradually reintroduce high-FODMAP foods into your diet one at a time. Most people should wait a few days before introducing the next new high-FODMAP food into their meal plan. Your dietitian can recommend how quickly you may reintroduce foods.  Keep a daily record of what and how much you eat and drink. Make note of any symptoms that you have after eating.  Review your daily record with a dietitian regularly to identify which foods you can eat and which foods you should avoid.  General tips  Drink enough fluid each day to keep your urine pale yellow.  Avoid processed foods. These often have added sugar and may be high in FODMAPs.  Avoid most dairy products, whole grains, and sweeteners.  Work with a dietitian to make sure you get enough fiber in your diet.  Avoid high FODMAP foods at meals to manage symptoms.     Recommended foods  Fruits  Bananas, oranges, tangerines, jamison, limes, blueberries, raspberries, strawberries, grapes, cantaloupe, honeydew melon, kiwi, papaya, passion fruit, and pineapple. Limited amounts of dried cranberries, banana chips, and shredded coconut.  Vegetables  Eggplant, zucchini, cucumber, peppers, green beans, bean sprouts,  "lettuce, arugula, kale, Swiss chard, spinach, maynor greens, bok fabiola, summer squash, potato, and tomato. Limited amounts of corn, carrot, and sweet potato. Green parts of scallions.  Grains  Gluten-free grains, such as rice, oats, buckwheat, quinoa, corn, polenta, and millet. Gluten-free pasta, bread, or cereal. Rice noodles. Corn tortillas.  Meats and other proteins  Unseasoned beef, pork, poultry, or fish. Eggs. [place]. Tofu (firm) and tempeh. Limited amounts of nuts and seeds, such as almonds, walnuts, brazil nuts, pecans, peanuts, nut butters, pumpkin seeds, gamaliel seeds, and sunflower seeds.  Dairy  Lactose-free milk, yogurt, and kefir. Lactose-free cottage cheese and ice cream. Non-dairy milks, such as almond, coconut, hemp, and rice milk. Non-dairy yogurt. Limited amounts of goat cheese, brie, mozzarella, parmesan, swiss, and other hard cheeses.  Fats and oils  Butter-free spreads. Vegetable oils, such as olive, canola, and sunflower oil.  Seasoning and other foods  Artificial sweeteners with names that do not end in \"ol,\" such as aspartame, saccharine, and stevia. Maple syrup, white table sugar, raw sugar, brown sugar, and molasses. Mayonnaise, soy sauce, and tamari. Fresh basil, coriander, parsley, rosemary, and thyme.  Beverages  Water and mineral water. Sugar-sweetened soft drinks. Small amounts of orange juice or cranberry juice. Black and green tea. Most dry faby. Coffee.  The items listed above may not be a complete list of foods and beverages you can eat. Contact a dietitian for more information.     Foods to avoid  Fruits  Fresh, dried, and juiced forms of apple, pear, watermelon, peach, plum, cherries, apricots, blackberries, boysenberries, figs, nectarines, and zuleyma. Avocado.  Vegetables  Chicory root, artichoke, asparagus, cabbage, snow peas, [place] sprouts, broccoli, sugar snap peas, mushrooms, celery, and cauliflower. Onions, garlic, leeks, and the white part of scallions.  Grains  Wheat, " including kamut, durum, and semolina. Barley and bulgur. Couscous. Wheat-based cereals. Wheat noodles, bread, crackers, and pastries.  Meats and other proteins  Fried or fatty meat. Sausage. Cashews and pistachios. Soybeans, baked beans, black beans, chickpeas, kidney beans, fabiola beans, navy beans, lentils, black-eyed peas, and split peas.  Dairy  Milk, yogurt, ice cream, and soft cheese. Cream and sour cream. Milk-based sauces. Custard. Buttermilk. Soy milk.  Seasoning and other foods  Any sugar-free gum or candy. Foods that contain artificial sweeteners such as sorbitol, mannitol, isomalt, or xylitol. Foods that contain honey, high-fructose corn syrup, or agave. Bouillon, vegetable stock, beef stock, and chicken stock. Garlic and onion powder. Condiments made with onion, such as hummus, chutney, pickles, relish, salad dressing, and salsa. Tomato paste.  Beverages  Chicory-based drinks. Coffee substitutes. Chamomile tea. Fennel tea. Sweet or fortified faby such as port or lu. Diet soft drinks made with isomalt, mannitol, maltitol, sorbitol, or xylitol. Apple, pear, and zuleyma juice. Juices with high-fructose corn syrup.  The items listed above may not be a complete list of foods and beverages you should avoid. Contact a dietitian for more information.     Summary  FODMAP stands for fermentable oligosaccharides, disaccharides, monosaccharides, and polyols. These are sugars that are hard for some people to digest.  A low-FODMAP eating plan is a short-term diet that helps to ease symptoms of certain bowel diseases.  The eating plan usually lasts up to 6 weeks. After that, high-FODMAP foods are reintroduced gradually and one at a time. This can help you find out which foods may be causing symptoms.  A low-FODMAP eating plan can be complicated. It is best to work with a dietitian who has experience with this type of plan.  This information is not intended to replace advice given to you by your health care provider.  Make sure you discuss any questions you have with your health care provider.  Document Revised: 05/06/2021 Document Reviewed: 05/06/2021  [employer] Patient Education © 2023 [employer]

## 2024-11-19 ENCOUNTER — OFFICE VISIT (OUTPATIENT)
Dept: GASTROENTEROLOGY | Facility: CLINIC | Age: 59
End: 2024-11-19
Payer: COMMERCIAL

## 2024-11-19 VITALS
WEIGHT: 172 LBS | BODY MASS INDEX: 29.52 KG/M2 | SYSTOLIC BLOOD PRESSURE: 122 MMHG | DIASTOLIC BLOOD PRESSURE: 78 MMHG | OXYGEN SATURATION: 98 % | HEART RATE: 85 BPM

## 2024-11-19 DIAGNOSIS — K58.0 IRRITABLE BOWEL SYNDROME WITH DIARRHEA: Chronic | ICD-10-CM

## 2024-11-19 DIAGNOSIS — K86.89 PANCREATIC INSUFFICIENCY: Chronic | ICD-10-CM

## 2024-11-19 DIAGNOSIS — K21.9 GASTROESOPHAGEAL REFLUX DISEASE WITHOUT ESOPHAGITIS: Chronic | ICD-10-CM

## 2024-11-19 DIAGNOSIS — K92.9 FUNCTIONAL GASTROINTESTINAL DISORDER: Chronic | ICD-10-CM

## 2024-11-19 DIAGNOSIS — R19.7 DIARRHEA, UNSPECIFIED TYPE: Primary | Chronic | ICD-10-CM

## 2024-11-19 DIAGNOSIS — R11.0 NAUSEA: Chronic | ICD-10-CM

## 2024-11-19 PROCEDURE — 99214 OFFICE O/P EST MOD 30 MIN: CPT | Performed by: NURSE PRACTITIONER

## 2024-11-19 RX ORDER — ONDANSETRON 4 MG/1
4 TABLET, FILM COATED ORAL EVERY 8 HOURS PRN
Qty: 30 TABLET | Refills: 1 | Status: SHIPPED | OUTPATIENT
Start: 2024-11-19

## 2024-11-19 NOTE — PATIENT INSTRUCTIONS
Antireflux measures: Avoid fried, fatty foods, alcohol, chocolate, coffee, tea,  soft drinks, peppermint and spearmint, spicy foods, tomatoes and tomato based foods, onion based foods, and smoking.  Other antireflux measures include weight reduction if overweight, avoiding tight clothing around the abdomen, elevating the head of the bed 6 inches with blocks under the head board, and don't drink or eat before going to bed and avoid lying down immediately after meals.  Omeprazole 40 mg 1 po daily in the am 30 minutes before breakfast.  Recommended to take Levothyroxine first in the am upon waking, wait 30 minutes, then take Omeprazole 40 mg, wait 30 minutes, then eat breakfast and take other medications.  Zofran 4 mg 1 po every 8 hours as needed for nausea.  The patient should eat 4-5 very small meals throughout the day. Avoid large meals.  It is recommended to eat a softer diet. Meats are best consumed ground. Fruits and vegetables are best consumed cooked or steamed and then mashed.   Low fiber, low fat diet with liberal water intake. May use soluble fiber.  Metamucil 1 packet/scoop daily or gummies/capsules 2-4 per day.   Low FODMAP diet - avoid all dairy. May use lactose free/dairy free alternatives such as almond milk, rice milk, oat milk, etc.   Continue Welcol tablets daily for diarrhea.   May take Imodium as needed for breakthrough diarrhea.   Bentyl 10 mg 1 po 3 times a day as needed for abdominal cramping.   Advised to exercise 30 minutes 4-5 days per week.   Advised to lose 10-15 pounds in the next 6-12 months.  Colonoscopy for screening in 2027 or sooner if needed.   Follow up: 6 months     Low-FODMAP Eating Plan    FODMAP stands for fermentable oligosaccharides, disaccharides, monosaccharides, and polyols. These are sugars that are hard for some people to digest. A low-FODMAP eating plan may help some people who have irritable bowel syndrome (IBS) and certain other bowel (intestinal) diseases to manage  their symptoms.  This meal plan can be complicated to follow. Work with a diet and nutrition specialist (dietitian) to make a low-FODMAP eating plan that is right for you. A dietitian can help make sure that you get enough nutrition from this diet.  What are tips for following this plan?  Reading food labels  Check labels for hidden FODMAPs such as:  High-fructose syrup.  Honey.  Agave.  Natural fruit flavors.  Onion or garlic powder.  Choose low-FODMAP foods that contain 3-4 grams of fiber per serving.  Check food labels for serving sizes. Eat only one serving at a time to make sure FODMAP levels stay low.  Shopping  Shop with a list of foods that are recommended on this diet and make a meal plan.  Meal planning  Follow a low-FODMAP eating plan for up to 6 weeks, or as told by your health care provider or dietitian.  To follow the eating plan:  Eliminate high-FODMAP foods from your diet completely. Choose only low-FODMAP foods to eat. You will do this for 2-6 weeks.  Gradually reintroduce high-FODMAP foods into your diet one at a time. Most people should wait a few days before introducing the next new high-FODMAP food into their meal plan. Your dietitian can recommend how quickly you may reintroduce foods.  Keep a daily record of what and how much you eat and drink. Make note of any symptoms that you have after eating.  Review your daily record with a dietitian regularly to identify which foods you can eat and which foods you should avoid.  General tips  Drink enough fluid each day to keep your urine pale yellow.  Avoid processed foods. These often have added sugar and may be high in FODMAPs.  Avoid most dairy products, whole grains, and sweeteners.  Work with a dietitian to make sure you get enough fiber in your diet.  Avoid high FODMAP foods at meals to manage symptoms.     Recommended foods  Fruits  Bananas, oranges, tangerines, jamison, limes, blueberries, raspberries, strawberries, grapes, cantaloupe, honeydew  "melon, kiwi, papaya, passion fruit, and pineapple. Limited amounts of dried cranberries, banana chips, and shredded coconut.  Vegetables  Eggplant, zucchini, cucumber, peppers, green beans, bean sprouts, lettuce, arugula, kale, Swiss chard, spinach, maynor greens, bok fabiola, summer squash, potato, and tomato. Limited amounts of corn, carrot, and sweet potato. Green parts of scallions.  Grains  Gluten-free grains, such as rice, oats, buckwheat, quinoa, corn, polenta, and millet. Gluten-free pasta, bread, or cereal. Rice noodles. Corn tortillas.  Meats and other proteins  Unseasoned beef, pork, poultry, or fish. Eggs. [place]. Tofu (firm) and tempeh. Limited amounts of nuts and seeds, such as almonds, walnuts, brazil nuts, pecans, peanuts, nut butters, pumpkin seeds, gamaliel seeds, and sunflower seeds.  Dairy  Lactose-free milk, yogurt, and kefir. Lactose-free cottage cheese and ice cream. Non-dairy milks, such as almond, coconut, hemp, and rice milk. Non-dairy yogurt. Limited amounts of goat cheese, brie, mozzarella, parmesan, swiss, and other hard cheeses.  Fats and oils  Butter-free spreads. Vegetable oils, such as olive, canola, and sunflower oil.  Seasoning and other foods  Artificial sweeteners with names that do not end in \"ol,\" such as aspartame, saccharine, and stevia. Maple syrup, white table sugar, raw sugar, brown sugar, and molasses. Mayonnaise, soy sauce, and tamari. Fresh basil, coriander, parsley, rosemary, and thyme.  Beverages  Water and mineral water. Sugar-sweetened soft drinks. Small amounts of orange juice or cranberry juice. Black and green tea. Most dry faby. Coffee.  The items listed above may not be a complete list of foods and beverages you can eat. Contact a dietitian for more information.     Foods to avoid  Fruits  Fresh, dried, and juiced forms of apple, pear, watermelon, peach, plum, cherries, apricots, blackberries, boysenberries, figs, nectarines, and zuleyma. Avocado.  Vegetables  Chicory " root, artichoke, asparagus, cabbage, snow peas, [place] sprouts, broccoli, sugar snap peas, mushrooms, celery, and cauliflower. Onions, garlic, leeks, and the white part of scallions.  Grains  Wheat, including kamut, durum, and semolina. Barley and bulgur. Couscous. Wheat-based cereals. Wheat noodles, bread, crackers, and pastries.  Meats and other proteins  Fried or fatty meat. Sausage. Cashews and pistachios. Soybeans, baked beans, black beans, chickpeas, kidney beans, fabiola beans, navy beans, lentils, black-eyed peas, and split peas.  Dairy  Milk, yogurt, ice cream, and soft cheese. Cream and sour cream. Milk-based sauces. Custard. Buttermilk. Soy milk.  Seasoning and other foods  Any sugar-free gum or candy. Foods that contain artificial sweeteners such as sorbitol, mannitol, isomalt, or xylitol. Foods that contain honey, high-fructose corn syrup, or agave. Bouillon, vegetable stock, beef stock, and chicken stock. Garlic and onion powder. Condiments made with onion, such as hummus, chutney, pickles, relish, salad dressing, and salsa. Tomato paste.  Beverages  Chicory-based drinks. Coffee substitutes. Chamomile tea. Fennel tea. Sweet or fortified faby such as port or lu. Diet soft drinks made with isomalt, mannitol, maltitol, sorbitol, or xylitol. Apple, pear, and zuleyma juice. Juices with high-fructose corn syrup.  The items listed above may not be a complete list of foods and beverages you should avoid. Contact a dietitian for more information.     Summary  FODMAP stands for fermentable oligosaccharides, disaccharides, monosaccharides, and polyols. These are sugars that are hard for some people to digest.  A low-FODMAP eating plan is a short-term diet that helps to ease symptoms of certain bowel diseases.  The eating plan usually lasts up to 6 weeks. After that, high-FODMAP foods are reintroduced gradually and one at a time. This can help you find out which foods may be causing symptoms.  A low-FODMAP  eating plan can be complicated. It is best to work with a dietitian who has experience with this type of plan.  This information is not intended to replace advice given to you by your health care provider. Make sure you discuss any questions you have with your health care provider.  Document Revised: 05/06/2021 Document Reviewed: 05/06/2021  [employer] Patient Education © 2023 [employer]

## 2024-11-19 NOTE — PROGRESS NOTES
Follow Up Note     Date: 2024   Patient Name: Meryl Perla  MRN: [id number]  : 1965     Primary Care Provider: Yoanna Valle APRN     Chief Complaint   Patient presents with    Diarrhea       2024  History of Present Illness  The patient is a 59-year-old female here for a follow-up for diarrhea.    She reports that her diarrhea has improved, but her stools remain soft. Occasionally, she experiences constipation, which she manages by reducing her Welchol intake for a day. She experiences morning nausea, which she manages with Zofran when it becomes severe and needs a refill. Denies hematemesis or melena.     Interval History:  2024  Meryl Perla is a 58 y.o. female who is here today for follow up for diarrhea. She has been doing better. She went to the ER about a month ago as she was having an episode of diarrhea and abdominal pain, but was told labs and CT scan was ok. Symptoms have now improved.  She is taking Welchol once a day and diarrhea is reasonably controlled most of the time, still has some breakthrough occasionally. She tried to take the Creon, but it caused her to have blood sugar drops and she did not feel well while taking it. She has since stopped it.  No GI bleeding.      2023  Meryl Perla is a 57 y.o. female who is here today to establish care with gastroenterology for elevated liver enzymes.      She has a history of elevated liver enzymes for the past 5-6 years. She denies personal or family history of liver disease. There is no history of IVDA, alcohol use, tattoos or blood transfusions.      She has a history of anemia for the past year or so. There is no history of GI bleeding, denies hematemesis, melena or hematochezia. There is no history of hematuria or vaginal bleeding.      She has a history of reflux for many years that is reasonably controlled with Omeprazole 40 mg daily. No difficulty swallowing. Denies nausea or vomiting.      She has a  "history of \"bile acid diarrhea\" for many years that is reasonably controlled with Welchol tablet 1-3 times per day. She usually has 1-2 soft bowel movements per day, occasionally has 3. She has occasional abdominal pain associated with bowel movements if having diarrhea, maybe 1-2 times per week.      Her last colonoscopy was in 2022 by Dr. Montenegro in [place], [place]. Her last EGD was 7-8 years ago. Her grandmother had stomach cancer. No family history of colon cancer.    Subjective      Past Medical History:   Diagnosis Date    Anxiety     Asthma     Back pain 2013    Bronchitis     \"in the past\"    Colon polyp 2016    Depression     Diabetes mellitus     Elevated liver enzymes     Fatty liver     Fibromyalgia     GERD (gastroesophageal reflux disease)     Heart murmur     \"younger\"    Hyperlipidemia     Hypertension     Hypothyroidism     Migraine     Osteoarthritis     Pneumonia     \"in the past\"    RLS (restless legs syndrome)     Sinus problem     Snores      Past Surgical History:   Procedure Laterality Date     SECTION  ,     CHOLECYSTECTOMY  2006    gallstones    COLONOSCOPY      COLONOSCOPY      COLONOSCOPY  2016    ENDOSCOPY N/A 8/15/2023    Procedure: ESOPHAGOGASTRODUODENOSCOPY WITH BIOPSY AND COLD BIOPSY POLYPECTOMY;  Surgeon: Xin Fiore MD;  Location: [facility] ENDOSCOPY;  Service: Gastroenterology;  Laterality: N/A;    HYSTERECTOMY      Partial hysty    OVARY SURGERY      removal of ovary    UPPER GASTROINTESTINAL ENDOSCOPY      UPPER GASTROINTESTINAL ENDOSCOPY      WISDOM TOOTH EXTRACTION       Family History   Problem Relation Age of Onset    Hypertension Mother     Stroke Mother     Mental illness Mother     Diabetes Father     Hypertension Father     Cancer Other     Stomach cancer Maternal Grandmother     Colon cancer Neg Hx     Ulcerative colitis Neg Hx     Esophageal cancer Neg Hx     Liver " "cancer Neg Hx     Liver disease Neg Hx      Social History     Socioeconomic History    Marital status:    Tobacco Use    Smoking status: Never    Smokeless tobacco: Never   Vaping Use    Vaping status: Never Used   Substance and Sexual Activity    Alcohol use: No     Comment: Former use; quit in 1989    Drug use: No    Sexual activity: Defer       Current Outpatient Medications:     aspirin 81 MG EC tablet, Take 1 tablet by mouth Every Night., Disp: , Rfl:     dicyclomine (BENTYL) 10 MG capsule, TK 1 C PO TID, Disp: , Rfl: 5    Emollient (CERAVE) lotion, , Disp: , Rfl: 0    glipiZIDE (GLUCOTROL) 5 MG ER tablet, Take 2 tablets by mouth 2 (Two) Times a Day., Disp: , Rfl: 3    levothyroxine (SYNTHROID, LEVOTHROID) 88 MCG tablet, Take 1 tablet by mouth Daily., Disp: , Rfl:     metFORMIN (GLUCOPHAGE) 500 MG tablet, Take 2 tablets by mouth 2 (Two) Times a Day., Disp: , Rfl:     montelukast (SINGULAIR) 10 MG tablet, Take 1 tablet by mouth Daily., Disp: , Rfl:     omeprazole (PriLOSEC) 40 MG capsule, TK ONE C PO QD BEFORE A MEAL, Disp: , Rfl: 1    pravastatin (PRAVACHOL) 20 MG tablet, Take 2 tablets by mouth Every Night., Disp: , Rfl:     pregabalin (LYRICA) 100 MG capsule, pregabalin 100 mg capsule  TAKE 1 CAPSULE BY MOUTH TWICE DAILY, Disp: , Rfl:     SITagliptin (JANUVIA) 100 MG tablet, Take 1 tablet by mouth Daily., Disp: , Rfl:     Trulicity 3 MG/0.5ML solution pen-injector, , Disp: , Rfl:     WELCHOL 625 MG tablet, , Disp: , Rfl: 5    ondansetron (ZOFRAN) 4 MG tablet, Take 1 tablet by mouth Every 8 (Eight) Hours As Needed for Nausea or Vomiting., Disp: 30 tablet, Rfl: 1    Allergies   Allergen Reactions    Cefuroxime Axetil Hives    Cephalexin Hives    Ceftin [Cefuroxime] Hives    Erythromycin Diarrhea and GI Intolerance     \"Severe bloating, diarrhea, and heartburn.\"    Adhesive Tape Other (See Comments)     \"Blister\"     The following portions of the patient's history were reviewed and updated as " appropriate: allergies, current medications, past family history, past medical history, past social history, past surgical history and problem list.  Objective     Physical Exam  Vitals and nursing note reviewed.   Constitutional:       General: She is not in acute distress.     Appearance: Normal appearance. She is well-developed.   HENT:      Head: Normocephalic and atraumatic.      Mouth/Throat:      Mouth: Mucous membranes are not pale, not dry and not cyanotic.   Eyes:      General: Lids are normal.   Neck:      Trachea: Trachea normal.   Cardiovascular:      Rate and Rhythm: Normal rate.   Pulmonary:      Effort: Pulmonary effort is normal. No respiratory distress.      Breath sounds: Normal breath sounds.   Abdominal:      Tenderness: There is no abdominal tenderness.   Skin:     General: Skin is warm and dry.   Neurological:      Mental Status: She is alert and oriented to person, place, and time.   Psychiatric:         Mood and Affect: Mood normal.         Speech: Speech normal.         Behavior: Behavior normal. Behavior is cooperative.       Vitals:    11/19/24 1623   BP: 122/78   Pulse: 85   SpO2: 98%   Weight: 78 kg (172 lb)     Body mass index is 29.52 kg/m².     Results Review:   I reviewed the patient's new clinical results.    No visits with results within 90 Day(s) from this visit.   Latest known visit with results is:   Lab on 11/27/2023   Component Date Value Ref Range Status    Glucose 11/27/2023 118 (H)  65 - 99 mg/dL Final    BUN 11/27/2023 23 (H)  6 - 20 mg/dL Final    Creatinine 11/27/2023 1.49 (H)  0.57 - 1.00 mg/dL Final    Sodium 11/27/2023 139  136 - 145 mmol/L Final    Potassium 11/27/2023 4.5  3.5 - 5.2 mmol/L Final    Chloride 11/27/2023 101  98 - 107 mmol/L Final    CO2 11/27/2023 22.0  22.0 - 29.0 mmol/L Final    Calcium 11/27/2023 10.2  8.6 - 10.5 mg/dL Final    Total Protein 11/27/2023 7.9  6.0 - 8.5 g/dL Final    Albumin 11/27/2023 5.2  3.5 - 5.2 g/dL Final    ALT (SGPT)  11/27/2023 34 (H)  1 - 33 U/L Final    AST (SGOT) 11/27/2023 32  1 - 32 U/L Final    Alkaline Phosphatase 11/27/2023 72  39 - 117 U/L Final    Total Bilirubin 11/27/2023 0.4  0.0 - 1.2 mg/dL Final    Globulin 11/27/2023 2.7  gm/dL Final    A/G Ratio 11/27/2023 1.9  g/dL Final    BUN/Creatinine Ratio 11/27/2023 15.4  7.0 - 25.0 Final    Anion Gap 11/27/2023 16.0 (H)  5.0 - 15.0 mmol/L Final    eGFR 11/27/2023 40.6 (L)  >60.0 mL/min/1.73 Final    WBC 11/27/2023 10.15  3.40 - 10.80 10*3/mm3 Final    RBC 11/27/2023 4.57  3.77 - 5.28 10*6/mm3 Final    Hemoglobin 11/27/2023 13.4  12.0 - 15.9 g/dL Final    Hematocrit 11/27/2023 38.9  34.0 - 46.6 % Final    MCV 11/27/2023 85.1  79.0 - 97.0 fL Final    MCH 11/27/2023 29.3  26.6 - 33.0 pg Final    MCHC 11/27/2023 34.4  31.5 - 35.7 g/dL Final    RDW 11/27/2023 13.0  12.3 - 15.4 % Final    RDW-SD 11/27/2023 40.0  37.0 - 54.0 fl Final    MPV 11/27/2023 11.4  6.0 - 12.0 fL Final    Platelets 11/27/2023 226  140 - 450 10*3/mm3 Final    Protime 11/27/2023 13.3  12.3 - 15.1 Seconds Final    INR 11/27/2023 0.96  0.90 - 1.10 Final    TSH 11/27/2023 3.990  0.270 - 4.200 uIU/mL Final    IgA 11/27/2023 246  70 - 400 mg/dL Final    Tissue Transglutaminase IgA 11/27/2023 <2  0 - 3 U/mL Final    Pancreatic Fecal 12/11/2023 98 (L)  >200 ug Elast./g Final    Calprotectin, Fecal 12/11/2023 14  0 - 120 ug/g Final    Toxigenic C. difficile by PCR 12/11/2023 Not Detected  Not Detected Final    Campylobacter 12/11/2023 Not Detected  Not Detected Final    Plesiomonas shigelloides 12/11/2023 Not Detected  Not Detected Final    Salmonella 12/11/2023 Not Detected  Not Detected Final    Vibrio 12/11/2023 Not Detected  Not Detected Final    Vibrio cholerae 12/11/2023 Not Detected  Not Detected Final    Yersinia enterocolitica 12/11/2023 Not Detected  Not Detected Final    Enteroaggregative E. coli (EAEC) 12/11/2023 Not Detected  Not Detected Final    Enteropathogenic E. coli (EPEC) 12/11/2023 Not  Detected  Not Detected Final    Enterotoxigenic E. coli (ETEC) lt/* 12/11/2023 Not Detected  Not Detected Final    Shiga-like toxin-producing E. coli* 12/11/2023 Not Detected  Not Detected Final    Shigella/Enteroinvasive E. coli (E* 12/11/2023 Not Detected  Not Detected Final    Cryptosporidium 12/11/2023 Not Detected  Not Detected Final    Cyclospora cayetanensis 12/11/2023 Not Detected  Not Detected Final    Entamoeba histolytica 12/11/2023 Not Detected  Not Detected Final    Giardia lamblia 12/11/2023 Not Detected  Not Detected Final    Adenovirus F40/41 12/11/2023 Not Detected  Not Detected Final    Astrovirus 12/11/2023 Not Detected  Not Detected Final    Norovirus GI/GII 12/11/2023 Not Detected  Not Detected Final    Rotavirus A 12/11/2023 Not Detected  Not Detected Final    Sapovirus (I, II, IV or V) 12/11/2023 Not Detected  Not Detected Final      Dated 4/7/2022 albumin 4.6 alkaline phosphatase 93 ALT 36 AST 46 total bilirubin <0.2 hemoglobin 10.4 hematocrit 34.0 platelet count 250, TSH 2.630, triglycerides 190, total cholesterol 177     Comprehensive Metabolic Panel (06/13/2023 10:03)  CBC (No Diff) (06/13/2023 10:03)  Protime-INR (06/13/2023 10:03)  Iron Profile (06/13/2023 10:03)  Ferritin (06/13/2023 10:03)  BETTINA (06/13/2023 10:03)  Anti-Smooth Muscle Antibody Titer (06/13/2023 10:03)  Mitochondrial Antibodies, M2 (06/13/2023 10:03)  Ceruloplasmin (06/13/2023 10:03)  Hepatitis A Antibody, Total (06/13/2023 10:03)  Hepatitis B Surf Antibody Quant (06/13/2023 10:03)  Hepatitis B Surface Antigen (06/13/2023 10:03)  Hepatitis B Core Antibody, Total (06/13/2023 10:03)  Hepatitis C Antibody (06/13/2023 10:03)  GREER Fibrosure Plus (06/13/2023 10:03)     COMPREHENSIVE METABOLIC PANEL (04/22/2024 20:37)  CBC with Auto Diff (04/22/2024 20:37)  LIPASE (04/22/2024 20:37)     Abdominal ultrasound limited 2/2/2023  Fatty infiltration of the liver.   The gallbladder is not identified.      CT Abdomen Pelvis With  Contrast     Result Date: 4/22/2024  No acute findings in the abdomen or pelvis to account for the patient's symptoms.      Colonoscopy dated 11/16/2016 per Dr. Grimaldo  - Scant diverticular change in the left colon.    - Small diminutive colon polyps.    - Internal and small external hemorrhoids.    - No endoscopic evidence of ileitis or colitis was seen.  Random biopsies were obtained from the colon upon withdrawal of the scope.    - Small bowel terminal ileum biopsy reveals preserved villous architecture with no significant histopathologic change.  No parasites noted.  No villous blunting or increased intraepithelial lymphocytes suggestive of celiac disease.  Random colon biopsies revealed colonic mucosa with no significant histopathologic change.  No histologic features of acute colitis, chronic colitis, collagenous colitis or lymphocytic colitis identified.  Rectal polyp biopsy reveals hyperplastic polyp.     Colonoscopy 8/31/2022 per Dr. Reyna Montenegro, surgical services  - The ileocecal valve and appendiceal orifice were identified. There was no   Sign whatsoever of any polyps, tumors, or inflammatory bowel disease. I did not  see any diverticula. We did do random biopsies of the right and left colon for microscopic colitis as she had noted that her diarrhea was increasing. She as well had two internal hemorrhoids. The patient tolerated the procedure well and was returned to the recovery room in good condition.   -Right colon biopsy with no significant histopathologic abnormality.  Left colon biopsy with no significant histopathologic abnormality.     EGD dated 8/15/2023 per Dr. Fiore  - Normal oropharynx.  - Z-line regular, 36 cm from the incisors.  - No gross lesions in the entire esophagus.  - Mild Erythematous mucosa in the antrum and prepyloric region of the stomach. Biopsied.  - A few gastric polyps. One resected and retrieved.  - Normal duodenal bulb, first portion of the duodenum, second portion  of the duodenum and third portion of the duodenum. Biopsied.  A.     DUODENUM, BIOPSY:  Duodenal type mucosa with no significant histopathologic abnormalities  Negative for Celiac disease, metaplasia, microorganisms or atypia  B.     ANTRUM AND BODY, BIOPSY:  Gastric mucosa without pathologic alterations  Negative for H. pylori, metaplasia or dysplasia  C.     STOMACH, BODY, POLYP:  Fundic gland polyp  Negative for H. pylori, metaplasia , dysplasia or malignancy      Assessment / Plan      1. Diarrhea, unspecified type  2. Nausea  3. Irritable bowel syndrome with diarrhea  4. Pancreatic insufficiency  5. Functional gastrointestinal disorder  Symptoms reasonably controlled with WelChol daily and Zofran as needed.  No GI bleeding. Basic labs unremarkable. TSH normal.  Celiac panel normal.  Fecal calprotectin normal.  Stool for C. difficile negative.  Gastrointestinal panel negative.  Pancreatic elastase low at 98. CTAP 4/22/2024 with pancreas and GI tract unremarkable. She tried taking Creon, but felt her made her blood sugar drop and she stopped it. Colonoscopy in 2016 unremarkable, TI and random colon biopsies unremarkable. Colonoscopy in August 2022 unremarkable, right colon and left colon biopsies unremarkable.  Suspect functional gastrointestinal disorder/IBS-D at baseline.  Low FODMAP diet - avoid all dairy.   Continue Welchol daily.  May take Imodium as needed for breakthrough diarrhea.   Zofran as needed for nausea.  Advised eat a softer diet 4-5 very small meals throughout the day, avoid large meals.  Consider repeating colonoscopy with TI and random colon biopsies if diarrhea worsens to rule out IBD/microscopic colitis.    - ondansetron (ZOFRAN) 4 MG tablet; Take 1 tablet by mouth Every 8 (Eight) Hours As Needed for Nausea or Vomiting.  Dispense: 30 tablet; Refill: 1    6. Gastroesophageal reflux disease without esophagitis  Reflux reasonably controlled with omeprazole 40 mg daily.  No difficulty swallowing.   EGD dated 8/15/2023 unremarkable, no Talley's.  Antireflux measures.  Continue omeprazole 40 mg daily for now.  Consider reducing dose to 20 mg daily in the future.    Prior History:  7. GREER (nonalcoholic steatohepatitis)  8. Class 1 obesity due to excess calories with serious comorbidity and body mass index (BMI) of 30.0 to 30.9 in adult  BMI 30.73  Recent labs with liver enzymes normal. Abdominal ultrasound dated 2/2/2023 with fatty liver noted.  CTAP 4/22/2024 with liver unremarkable. She is negative for hepatitis B and hepatitis C. PT/INR normal. Iron panel normal. Ferritin normal. BETTINA negative. Smooth muscle antibody normal. Mitochondrial antibody normal. Ceruloplasmin normal. FibroSure with Greer N2/S2-S3/F0-F1.   Recommend low-fat diet, exercise and weight reduction.  May need to be started on fenofibrate in addition to her Pravachol if triglycerides continue to be elevated. Lipid panel is monitored through PCP office.     9. Encounter for screening for malignant neoplasm of colon  Colonoscopy in August 2022 by Dr. Montenegro in [facility] with no polyps removed.  Left and right colon biopsies unremarkable.  She was recommended colonoscopy in 5 years at that time.  Colonoscopy in August 2027 or sooner if needed.    Patient Instructions   Antireflux measures: Avoid fried, fatty foods, alcohol, chocolate, coffee, tea,  soft drinks, peppermint and spearmint, spicy foods, tomatoes and tomato based foods, onion based foods, and smoking.  Other antireflux measures include weight reduction if overweight, avoiding tight clothing around the abdomen, elevating the head of the bed 6 inches with blocks under the head board, and don't drink or eat before going to bed and avoid lying down immediately after meals.  Omeprazole 40 mg 1 po daily in the am 30 minutes before breakfast.  Recommended to take Levothyroxine first in the am upon waking, wait 30 minutes, then take Omeprazole 40 mg, wait 30 minutes, then eat  breakfast and take other medications.  Zofran 4 mg 1 po every 8 hours as needed for nausea.  The patient should eat 4-5 very small meals throughout the day. Avoid large meals.  It is recommended to eat a softer diet. Meats are best consumed ground. Fruits and vegetables are best consumed cooked or steamed and then mashed.   Low fiber, low fat diet with liberal water intake. May use soluble fiber.  Metamucil 1 packet/scoop daily or gummies/capsules 2-4 per day.   Low FODMAP diet - avoid all dairy. May use lactose free/dairy free alternatives such as almond milk, rice milk, oat milk, etc.   Continue Welcol tablets daily for diarrhea.   May take Imodium as needed for breakthrough diarrhea.   Bentyl 10 mg 1 po 3 times a day as needed for abdominal cramping.   Advised to exercise 30 minutes 4-5 days per week.   Advised to lose 10-15 pounds in the next 6-12 months.  Colonoscopy for screening in 2027 or sooner if needed.   Follow up: 6 months     Low-FODMAP Eating Plan    FODMAP stands for fermentable oligosaccharides, disaccharides, monosaccharides, and polyols. These are sugars that are hard for some people to digest. A low-FODMAP eating plan may help some people who have irritable bowel syndrome (IBS) and certain other bowel (intestinal) diseases to manage their symptoms.  This meal plan can be complicated to follow. Work with a diet and nutrition specialist (dietitian) to make a low-FODMAP eating plan that is right for you. A dietitian can help make sure that you get enough nutrition from this diet.  What are tips for following this plan?  Reading food labels  Check labels for hidden FODMAPs such as:  High-fructose syrup.  Honey.  Agave.  Natural fruit flavors.  Onion or garlic powder.  Choose low-FODMAP foods that contain 3-4 grams of fiber per serving.  Check food labels for serving sizes. Eat only one serving at a time to make sure FODMAP levels stay low.  Shopping  Shop with a list of foods that are recommended on  this diet and make a meal plan.  Meal planning  Follow a low-FODMAP eating plan for up to 6 weeks, or as told by your health care provider or dietitian.  To follow the eating plan:  Eliminate high-FODMAP foods from your diet completely. Choose only low-FODMAP foods to eat. You will do this for 2-6 weeks.  Gradually reintroduce high-FODMAP foods into your diet one at a time. Most people should wait a few days before introducing the next new high-FODMAP food into their meal plan. Your dietitian can recommend how quickly you may reintroduce foods.  Keep a daily record of what and how much you eat and drink. Make note of any symptoms that you have after eating.  Review your daily record with a dietitian regularly to identify which foods you can eat and which foods you should avoid.  General tips  Drink enough fluid each day to keep your urine pale yellow.  Avoid processed foods. These often have added sugar and may be high in FODMAPs.  Avoid most dairy products, whole grains, and sweeteners.  Work with a dietitian to make sure you get enough fiber in your diet.  Avoid high FODMAP foods at meals to manage symptoms.     Recommended foods  Fruits  Bananas, oranges, tangerines, jamison, limes, blueberries, raspberries, strawberries, grapes, cantaloupe, honeydew melon, kiwi, papaya, passion fruit, and pineapple. Limited amounts of dried cranberries, banana chips, and shredded coconut.  Vegetables  Eggplant, zucchini, cucumber, peppers, green beans, bean sprouts, lettuce, arugula, kale, Swiss chard, spinach, maynor greens, bok fabiola, summer squash, potato, and tomato. Limited amounts of corn, carrot, and sweet potato. Green parts of scallions.  Grains  Gluten-free grains, such as rice, oats, buckwheat, quinoa, corn, polenta, and millet. Gluten-free pasta, bread, or cereal. Rice noodles. Corn tortillas.  Meats and other proteins  Unseasoned beef, pork, poultry, or fish. Eggs. [place]. Tofu (firm) and tempeh. Limited amounts of  "nuts and seeds, such as almonds, walnuts, brazil nuts, pecans, peanuts, nut butters, pumpkin seeds, gamaliel seeds, and sunflower seeds.  Dairy  Lactose-free milk, yogurt, and kefir. Lactose-free cottage cheese and ice cream. Non-dairy milks, such as almond, coconut, hemp, and rice milk. Non-dairy yogurt. Limited amounts of goat cheese, brie, mozzarella, parmesan, swiss, and other hard cheeses.  Fats and oils  Butter-free spreads. Vegetable oils, such as olive, canola, and sunflower oil.  Seasoning and other foods  Artificial sweeteners with names that do not end in \"ol,\" such as aspartame, saccharine, and stevia. Maple syrup, white table sugar, raw sugar, brown sugar, and molasses. Mayonnaise, soy sauce, and tamari. Fresh basil, coriander, parsley, rosemary, and thyme.  Beverages  Water and mineral water. Sugar-sweetened soft drinks. Small amounts of orange juice or cranberry juice. Black and green tea. Most dry faby. Coffee.  The items listed above may not be a complete list of foods and beverages you can eat. Contact a dietitian for more information.     Foods to avoid  Fruits  Fresh, dried, and juiced forms of apple, pear, watermelon, peach, plum, cherries, apricots, blackberries, boysenberries, figs, nectarines, and zuleyma. Avocado.  Vegetables  Chicory root, artichoke, asparagus, cabbage, snow peas, [place] sprouts, broccoli, sugar snap peas, mushrooms, celery, and cauliflower. Onions, garlic, leeks, and the white part of scallions.  Grains  Wheat, including kamut, durum, and semolina. Barley and bulgur. Couscous. Wheat-based cereals. Wheat noodles, bread, crackers, and pastries.  Meats and other proteins  Fried or fatty meat. Sausage. Cashews and pistachios. Soybeans, baked beans, black beans, chickpeas, kidney beans, fabiola beans, navy beans, lentils, black-eyed peas, and split peas.  Dairy  Milk, yogurt, ice cream, and soft cheese. Cream and sour cream. Milk-based sauces. Custard. Buttermilk. Soy " milk.  Seasoning and other foods  Any sugar-free gum or candy. Foods that contain artificial sweeteners such as sorbitol, mannitol, isomalt, or xylitol. Foods that contain honey, high-fructose corn syrup, or agave. Bouillon, vegetable stock, beef stock, and chicken stock. Garlic and onion powder. Condiments made with onion, such as hummus, chutney, pickles, relish, salad dressing, and salsa. Tomato paste.  Beverages  Chicory-based drinks. Coffee substitutes. Chamomile tea. Fennel tea. Sweet or fortified faby such as port or lu. Diet soft drinks made with isomalt, mannitol, maltitol, sorbitol, or xylitol. Apple, pear, and zuleyma juice. Juices with high-fructose corn syrup.  The items listed above may not be a complete list of foods and beverages you should avoid. Contact a dietitian for more information.     Summary  FODMAP stands for fermentable oligosaccharides, disaccharides, monosaccharides, and polyols. These are sugars that are hard for some people to digest.  A low-FODMAP eating plan is a short-term diet that helps to ease symptoms of certain bowel diseases.  The eating plan usually lasts up to 6 weeks. After that, high-FODMAP foods are reintroduced gradually and one at a time. This can help you find out which foods may be causing symptoms.  A low-FODMAP eating plan can be complicated. It is best to work with a dietitian who has experience with this type of plan.  This information is not intended to replace advice given to you by your health care provider. Make sure you discuss any questions you have with your health care provider.  Document Revised: 05/06/2021 Document Reviewed: 05/06/2021  Document [employer] Patient Education © 2023 [employer]     NORY Millan  11/19/2024    Please note that portions of this note were completed with a voice recognition program.     Patient or patient representative verbalized consent for the use of Ambient Listening during the visit with  NORY Millan for  chart documentation. 11/19/2024  16:49 EST

## 2025-03-20 DIAGNOSIS — R11.0 NAUSEA: Chronic | ICD-10-CM

## 2025-03-24 RX ORDER — ONDANSETRON 4 MG/1
4 TABLET, FILM COATED ORAL EVERY 8 HOURS PRN
Qty: 30 TABLET | Refills: 1 | Status: SHIPPED | OUTPATIENT
Start: 2025-03-24

## 2025-05-20 ENCOUNTER — OFFICE VISIT (OUTPATIENT)
Dept: GASTROENTEROLOGY | Facility: CLINIC | Age: 60
End: 2025-05-20
Payer: COMMERCIAL

## 2025-05-20 VITALS
HEART RATE: 77 BPM | BODY MASS INDEX: 26.95 KG/M2 | DIASTOLIC BLOOD PRESSURE: 62 MMHG | WEIGHT: 157 LBS | SYSTOLIC BLOOD PRESSURE: 102 MMHG | OXYGEN SATURATION: 99 %

## 2025-05-20 DIAGNOSIS — K75.81 METABOLIC DYSFUNCTION-ASSOCIATED STEATOHEPATITIS (MASH): Chronic | ICD-10-CM

## 2025-05-20 DIAGNOSIS — K58.0 IRRITABLE BOWEL SYNDROME WITH DIARRHEA: Primary | Chronic | ICD-10-CM

## 2025-05-20 DIAGNOSIS — R19.7 DIARRHEA, UNSPECIFIED TYPE: Chronic | ICD-10-CM

## 2025-05-20 DIAGNOSIS — K76.0 FATTY (CHANGE OF) LIVER, NOT ELSEWHERE CLASSIFIED: Chronic | ICD-10-CM

## 2025-05-20 DIAGNOSIS — E66.3 OVERWEIGHT WITH BODY MASS INDEX (BMI) OF 26 TO 26.9 IN ADULT: Chronic | ICD-10-CM

## 2025-05-20 DIAGNOSIS — K21.9 GASTROESOPHAGEAL REFLUX DISEASE WITHOUT ESOPHAGITIS: Chronic | ICD-10-CM

## 2025-05-20 DIAGNOSIS — K92.9 FUNCTIONAL GASTROINTESTINAL DISORDER: Chronic | ICD-10-CM

## 2025-05-20 DIAGNOSIS — K86.89 PANCREATIC INSUFFICIENCY: Chronic | ICD-10-CM

## 2025-05-20 DIAGNOSIS — R11.0 NAUSEA: Chronic | ICD-10-CM

## 2025-05-20 DIAGNOSIS — Z12.11 ENCOUNTER FOR SCREENING FOR MALIGNANT NEOPLASM OF COLON: ICD-10-CM

## 2025-05-20 RX ORDER — ATOMOXETINE 60 MG/1
60 CAPSULE ORAL DAILY
COMMUNITY

## 2025-05-20 RX ORDER — ONDANSETRON 4 MG/1
4 TABLET, FILM COATED ORAL EVERY 8 HOURS PRN
Qty: 45 TABLET | Refills: 2 | Status: SHIPPED | OUTPATIENT
Start: 2025-05-20

## 2025-05-20 RX ORDER — EMPAGLIFLOZIN 10 MG/1
TABLET, FILM COATED ORAL
COMMUNITY
Start: 2025-05-01

## 2025-05-20 NOTE — PATIENT INSTRUCTIONS
Antireflux measures: Avoid fried, fatty foods, alcohol, chocolate, coffee, tea,  soft drinks, peppermint and spearmint, spicy foods, tomatoes and tomato based foods, onion based foods, and smoking.  Other antireflux measures include weight reduction if overweight, avoiding tight clothing around the abdomen, elevating the head of the bed 6 inches with blocks under the head board, and don't drink or eat before going to bed and avoid lying down immediately after meals.  Omeprazole 40 mg 1 po daily in the am 30 minutes before breakfast.  Recommended to take Levothyroxine first in the am upon waking, wait 30 minutes, then take Omeprazole 40 mg, wait 30 minutes, then eat breakfast and take other medications.  Zofran 4 mg 1 po every 8 hours as needed for nausea.  The patient should eat 4-5 very small meals throughout the day. Avoid large meals.  It is recommended to eat a softer diet. Meats are best consumed ground. Fruits and vegetables are best consumed cooked or steamed and then mashed.   Low fiber, low fat diet with liberal water intake. May use soluble fiber.  Metamucil 1 packet/scoop daily or gummies/capsules 2-4 per day.   Low FODMAP diet - avoid all dairy. May use lactose free/dairy free alternatives such as almond milk, rice milk, oat milk, etc.   Continue Welcol tablets daily for diarrhea.   May take Imodium as needed for breakthrough diarrhea.   Bentyl 10 mg 1 po 3 times a day as needed for abdominal cramping.   Advised to exercise 30 minutes 4-5 days per week.   Advised to maintain ideal body weight.  Colonoscopy for screening in 2027 or sooner if needed.   Labs  Abdominal ultrasound  Follow up: 6 months       Low-FODMAP Eating Plan    FODMAP stands for fermentable oligosaccharides, disaccharides, monosaccharides, and polyols. These are sugars that are hard for some people to digest. A low-FODMAP eating plan may help some people who have irritable bowel syndrome (IBS) and certain other bowel (intestinal)  diseases to manage their symptoms.  This meal plan can be complicated to follow. Work with a diet and nutrition specialist (dietitian) to make a low-FODMAP eating plan that is right for you. A dietitian can help make sure that you get enough nutrition from this diet.  What are tips for following this plan?  Reading food labels  Check labels for hidden FODMAPs such as:  High-fructose syrup.  Honey.  Agave.  Natural fruit flavors.  Onion or garlic powder.  Choose low-FODMAP foods that contain 3-4 grams of fiber per serving.  Check food labels for serving sizes. Eat only one serving at a time to make sure FODMAP levels stay low.  Shopping  Shop with a list of foods that are recommended on this diet and make a meal plan.  Meal planning  Follow a low-FODMAP eating plan for up to 6 weeks, or as told by your health care provider or dietitian.  To follow the eating plan:  Eliminate high-FODMAP foods from your diet completely. Choose only low-FODMAP foods to eat. You will do this for 2-6 weeks.  Gradually reintroduce high-FODMAP foods into your diet one at a time. Most people should wait a few days before introducing the next new high-FODMAP food into their meal plan. Your dietitian can recommend how quickly you may reintroduce foods.  Keep a daily record of what and how much you eat and drink. Make note of any symptoms that you have after eating.  Review your daily record with a dietitian regularly to identify which foods you can eat and which foods you should avoid.  General tips  Drink enough fluid each day to keep your urine pale yellow.  Avoid processed foods. These often have added sugar and may be high in FODMAPs.  Avoid most dairy products, whole grains, and sweeteners.  Work with a dietitian to make sure you get enough fiber in your diet.  Avoid high FODMAP foods at meals to manage symptoms.     Recommended foods  Fruits  Bananas, oranges, tangerines, jamison, limes, blueberries, raspberries, strawberries, grapes,  "cantaloupe, honeydew melon, kiwi, papaya, passion fruit, and pineapple. Limited amounts of dried cranberries, banana chips, and shredded coconut.  Vegetables  Eggplant, zucchini, cucumber, peppers, green beans, bean sprouts, lettuce, arugula, kale, Swiss chard, spinach, maynor greens, bok fabiola, summer squash, potato, and tomato. Limited amounts of corn, carrot, and sweet potato. Green parts of scallions.  Grains  Gluten-free grains, such as rice, oats, buckwheat, quinoa, corn, polenta, and millet. Gluten-free pasta, bread, or cereal. Rice noodles. Corn tortillas.  Meats and other proteins  Unseasoned beef, pork, poultry, or fish. Eggs. [place]. Tofu (firm) and tempeh. Limited amounts of nuts and seeds, such as almonds, walnuts, brazil nuts, pecans, peanuts, nut butters, pumpkin seeds, gamaliel seeds, and sunflower seeds.  Dairy  Lactose-free milk, yogurt, and kefir. Lactose-free cottage cheese and ice cream. Non-dairy milks, such as almond, coconut, hemp, and rice milk. Non-dairy yogurt. Limited amounts of goat cheese, brie, mozzarella, parmesan, swiss, and other hard cheeses.  Fats and oils  Butter-free spreads. Vegetable oils, such as olive, canola, and sunflower oil.  Seasoning and other foods  Artificial sweeteners with names that do not end in \"ol,\" such as aspartame, saccharine, and stevia. Maple syrup, white table sugar, raw sugar, brown sugar, and molasses. Mayonnaise, soy sauce, and tamari. Fresh basil, coriander, parsley, rosemary, and thyme.  Beverages  Water and mineral water. Sugar-sweetened soft drinks. Small amounts of orange juice or cranberry juice. Black and green tea. Most dry faby. Coffee.  The items listed above may not be a complete list of foods and beverages you can eat. Contact a dietitian for more information.     Foods to avoid  Fruits  Fresh, dried, and juiced forms of apple, pear, watermelon, peach, plum, cherries, apricots, blackberries, boysenberries, figs, nectarines, and zuleyma. " Avocado.  Vegetables  Chicory root, artichoke, asparagus, cabbage, snow peas, [place] sprouts, broccoli, sugar snap peas, mushrooms, celery, and cauliflower. Onions, garlic, leeks, and the white part of scallions.  Grains  Wheat, including kamut, durum, and semolina. Barley and bulgur. Couscous. Wheat-based cereals. Wheat noodles, bread, crackers, and pastries.  Meats and other proteins  Fried or fatty meat. Sausage. Cashews and pistachios. Soybeans, baked beans, black beans, chickpeas, kidney beans, fabiola beans, navy beans, lentils, black-eyed peas, and split peas.  Dairy  Milk, yogurt, ice cream, and soft cheese. Cream and sour cream. Milk-based sauces. Custard. Buttermilk. Soy milk.  Seasoning and other foods  Any sugar-free gum or candy. Foods that contain artificial sweeteners such as sorbitol, mannitol, isomalt, or xylitol. Foods that contain honey, high-fructose corn syrup, or agave. Bouillon, vegetable stock, beef stock, and chicken stock. Garlic and onion powder. Condiments made with onion, such as hummus, chutney, pickles, relish, salad dressing, and salsa. Tomato paste.  Beverages  Chicory-based drinks. Coffee substitutes. Chamomile tea. Fennel tea. Sweet or fortified faby such as port or lu. Diet soft drinks made with isomalt, mannitol, maltitol, sorbitol, or xylitol. Apple, pear, and zuleyma juice. Juices with high-fructose corn syrup.  The items listed above may not be a complete list of foods and beverages you should avoid. Contact a dietitian for more information.     Summary  FODMAP stands for fermentable oligosaccharides, disaccharides, monosaccharides, and polyols. These are sugars that are hard for some people to digest.  A low-FODMAP eating plan is a short-term diet that helps to ease symptoms of certain bowel diseases.  The eating plan usually lasts up to 6 weeks. After that, high-FODMAP foods are reintroduced gradually and one at a time. This can help you find out which foods may be causing  symptoms.  A low-FODMAP eating plan can be complicated. It is best to work with a dietitian who has experience with this type of plan.  This information is not intended to replace advice given to you by your health care provider. Make sure you discuss any questions you have with your health care provider.  Document Revised: 05/06/2021 Document Reviewed: 05/06/2021  [employer] Patient Education © 2023 [employer]

## 2025-05-20 NOTE — PROGRESS NOTES
Follow Up Note     Date: 2025   Patient Name: Meryl Perla  MRN: [id number]  : 1965     Primary Care Provider: Yoanna Valle APRN     Chief Complaint   Patient presents with    Diarrhea     2025  History of Present Illness  The patient is a 59-year-old female who is here for follow-up of diarrhea.    She reports intermittent morning nausea the onset of nausea typically occurs immediately upon waking.  She has a history of diabetes and is on Trulicity, she was recently started on Jardiance, which she feels may be making her symptoms worse.  She has been managing her symptoms with Zofran. She has found some relief by consuming a small amount of food, such as a mini bar, during her 30-minute commute to work. Despite this, she continues to experience nausea approximately 50% of the time.  No history of hematemesis or melena.    She experienced severe diarrhea after starting Trulicity, which was managed with WelChol, initially at a dose of 6 tablets daily, now reduced to 2 tablets. She is considering further reducing the dose. She takes only 1 tablet on weekends to avoid constipation, but this has resulted in occasional diarrhea. She has not attempted to reduce the dose to 1 tablet on workdays.  She denies any history of hematochezia.    Her last blood work showed slightly elevated triglycerides, which she believes may be familial. She is hopeful that Jardiance and weight loss will help manage this.     Interval History:  2024  The patient is a 59-year-old female here for a follow-up for diarrhea.  She reports that her diarrhea has improved, but her stools remain soft. Occasionally, she experiences constipation, which she manages by reducing her Welchol intake for a day. She experiences morning nausea, which she manages with Zofran when it becomes severe and needs a refill. Denies hematemesis or melena.     2023  Meryl Perla is a 57 y.o. female who is here today to  "establish care with gastroenterology for elevated liver enzymes.      She has a history of elevated liver enzymes for the past 5-6 years. She denies personal or family history of liver disease. There is no history of IVDA, alcohol use, tattoos or blood transfusions.      She has a history of anemia for the past year or so. There is no history of GI bleeding, denies hematemesis, melena or hematochezia. There is no history of hematuria or vaginal bleeding.      She has a history of reflux for many years that is reasonably controlled with Omeprazole 40 mg daily. No difficulty swallowing. Denies nausea or vomiting.      She has a history of \"bile acid diarrhea\" for many years that is reasonably controlled with Welchol tablet 1-3 times per day. She usually has 1-2 soft bowel movements per day, occasionally has 3. She has occasional abdominal pain associated with bowel movements if having diarrhea, maybe 1-2 times per week.      Her last colonoscopy was in 2022 by Dr. Montenegro in [place], [place]. Her last EGD was 7-8 years ago. Her grandmother had stomach cancer. No family history of colon cancer.    Subjective      Past Medical History:   Diagnosis Date    Anxiety     Asthma     Back pain 2013    Bronchitis     \"in the past\"    Colon polyp 2016    Depression     Diabetes mellitus 2015    Elevated liver enzymes     Fatty liver     Fibromyalgia     GERD (gastroesophageal reflux disease)     Heart murmur     \"younger\"    Hyperlipidemia     Hypertension     Hypothyroidism     Migraine     Osteoarthritis 2013    Pneumonia     \"in the past\"    RLS (restless legs syndrome)     Sinus problem 1993    Snores      Past Surgical History:   Procedure Laterality Date     SECTION  ,     CHOLECYSTECTOMY  2006    gallstones    COLONOSCOPY      COLONOSCOPY      COLONOSCOPY  2016    ENDOSCOPY N/A 8/15/2023    Procedure: ESOPHAGOGASTRODUODENOSCOPY WITH BIOPSY AND COLD BIOPSY " POLYPECTOMY;  Surgeon: Xin Fiore MD;  Location: [facility] ENDOSCOPY;  Service: Gastroenterology;  Laterality: N/A;    HYSTERECTOMY  2008    Partial hysty    OVARY SURGERY  2010    removal of ovary    UPPER GASTROINTESTINAL ENDOSCOPY  2012    UPPER GASTROINTESTINAL ENDOSCOPY  2006    WISDOM TOOTH EXTRACTION       Family History   Problem Relation Age of Onset    Hypertension Mother     Stroke Mother     Mental illness Mother     Diabetes Father     Hypertension Father     Cancer Other     Stomach cancer Maternal Grandmother     Colon cancer Neg Hx     Ulcerative colitis Neg Hx     Esophageal cancer Neg Hx     Liver cancer Neg Hx     Liver disease Neg Hx      Social History     Socioeconomic History    Marital status:    Tobacco Use    Smoking status: Never    Smokeless tobacco: Never   Vaping Use    Vaping status: Never Used   Substance and Sexual Activity    Alcohol use: No     Comment: Former use; quit in 1989    Drug use: No    Sexual activity: Defer       Current Outpatient Medications:     aspirin 81 MG EC tablet, Take 1 tablet by mouth Every Night., Disp: , Rfl:     atomoxetine (STRATTERA) 60 MG capsule, Take 1 capsule by mouth Daily., Disp: , Rfl:     dicyclomine (BENTYL) 10 MG capsule, TK 1 C PO TID, Disp: , Rfl: 5    Emollient (CERAVE) lotion, , Disp: , Rfl: 0    Jardiance 10 MG tablet tablet, , Disp: , Rfl:     levothyroxine (SYNTHROID, LEVOTHROID) 88 MCG tablet, Take 1 tablet by mouth Daily., Disp: , Rfl:     metFORMIN (GLUCOPHAGE) 500 MG tablet, Take 2 tablets by mouth 2 (Two) Times a Day., Disp: , Rfl:     montelukast (SINGULAIR) 10 MG tablet, Take 1 tablet by mouth Daily., Disp: , Rfl:     omeprazole (PriLOSEC) 40 MG capsule, TK ONE C PO QD BEFORE A MEAL, Disp: , Rfl: 1    ondansetron (ZOFRAN) 4 MG tablet, Take 1 tablet by mouth Every 8 (Eight) Hours As Needed for Nausea or Vomiting., Disp: 45 tablet, Rfl: 2    pravastatin (PRAVACHOL) 20 MG tablet, Take 2 tablets by mouth Every Night.,  "Disp: , Rfl:     pregabalin (LYRICA) 100 MG capsule, pregabalin 100 mg capsule  TAKE 1 CAPSULE BY MOUTH TWICE DAILY, Disp: , Rfl:     Trulicity 3 MG/0.5ML solution pen-injector, , Disp: , Rfl:     WELCHOL 625 MG tablet, , Disp: , Rfl: 5    glipiZIDE (GLUCOTROL) 5 MG ER tablet, Take 2 tablets by mouth 2 (Two) Times a Day. (Patient not taking: Reported on 5/20/2025), Disp: , Rfl: 3    Allergies   Allergen Reactions    Cefuroxime Axetil Hives    Cephalexin Hives    Ceftin [Cefuroxime] Hives    Erythromycin Diarrhea and GI Intolerance     \"Severe bloating, diarrhea, and heartburn.\"    Adhesive Tape Other (See Comments)     \"Blister\"     The following portions of the patient's history were reviewed and updated as appropriate: allergies, current medications, past family history, past medical history, past social history, past surgical history and problem list.  Objective     Physical Exam  Vitals and nursing note reviewed.   Constitutional:       General: She is not in acute distress.     Appearance: Normal appearance. She is well-developed.   HENT:      Head: Normocephalic and atraumatic.      Mouth/Throat:      Mouth: Mucous membranes are not pale, not dry and not cyanotic.   Eyes:      General: Lids are normal.   Neck:      Trachea: Trachea normal.   Cardiovascular:      Rate and Rhythm: Normal rate.   Pulmonary:      Effort: Pulmonary effort is normal. No respiratory distress.      Breath sounds: Normal breath sounds.   Abdominal:      Tenderness: There is no abdominal tenderness.   Skin:     General: Skin is warm and dry.   Neurological:      Mental Status: She is alert and oriented to person, place, and time.   Psychiatric:         Mood and Affect: Mood normal.         Speech: Speech normal.         Behavior: Behavior normal. Behavior is cooperative.       Vitals:    05/20/25 1633   BP: 102/62   Pulse: 77   SpO2: 99%   Weight: 71.2 kg (157 lb)     Body mass index is 26.95 kg/m².     Results Review:   I reviewed the " patient's new clinical results.    No visits with results within 90 Day(s) from this visit.   Latest known visit with results is:   Lab on 11/27/2023   Component Date Value Ref Range Status    Glucose 11/27/2023 118 (H)  65 - 99 mg/dL Final    BUN 11/27/2023 23 (H)  6 - 20 mg/dL Final    Creatinine 11/27/2023 1.49 (H)  0.57 - 1.00 mg/dL Final    Sodium 11/27/2023 139  136 - 145 mmol/L Final    Potassium 11/27/2023 4.5  3.5 - 5.2 mmol/L Final    Chloride 11/27/2023 101  98 - 107 mmol/L Final    CO2 11/27/2023 22.0  22.0 - 29.0 mmol/L Final    Calcium 11/27/2023 10.2  8.6 - 10.5 mg/dL Final    Total Protein 11/27/2023 7.9  6.0 - 8.5 g/dL Final    Albumin 11/27/2023 5.2  3.5 - 5.2 g/dL Final    ALT (SGPT) 11/27/2023 34 (H)  1 - 33 U/L Final    AST (SGOT) 11/27/2023 32  1 - 32 U/L Final    Alkaline Phosphatase 11/27/2023 72  39 - 117 U/L Final    Total Bilirubin 11/27/2023 0.4  0.0 - 1.2 mg/dL Final    Globulin 11/27/2023 2.7  gm/dL Final    A/G Ratio 11/27/2023 1.9  g/dL Final    BUN/Creatinine Ratio 11/27/2023 15.4  7.0 - 25.0 Final    Anion Gap 11/27/2023 16.0 (H)  5.0 - 15.0 mmol/L Final    eGFR 11/27/2023 40.6 (L)  >60.0 mL/min/1.73 Final    WBC 11/27/2023 10.15  3.40 - 10.80 10*3/mm3 Final    RBC 11/27/2023 4.57  3.77 - 5.28 10*6/mm3 Final    Hemoglobin 11/27/2023 13.4  12.0 - 15.9 g/dL Final    Hematocrit 11/27/2023 38.9  34.0 - 46.6 % Final    MCV 11/27/2023 85.1  79.0 - 97.0 fL Final    MCH 11/27/2023 29.3  26.6 - 33.0 pg Final    MCHC 11/27/2023 34.4  31.5 - 35.7 g/dL Final    RDW 11/27/2023 13.0  12.3 - 15.4 % Final    RDW-SD 11/27/2023 40.0  37.0 - 54.0 fl Final    MPV 11/27/2023 11.4  6.0 - 12.0 fL Final    Platelets 11/27/2023 226  140 - 450 10*3/mm3 Final    Protime 11/27/2023 13.3  12.3 - 15.1 Seconds Final    INR 11/27/2023 0.96  0.90 - 1.10 Final    TSH 11/27/2023 3.990  0.270 - 4.200 uIU/mL Final    IgA 11/27/2023 246  70 - 400 mg/dL Final    Tissue Transglutaminase IgA 11/27/2023 <2  0 - 3 U/mL Final     Pancreatic Fecal 12/11/2023 98 (L)  >200 ug Elast./g Final    Calprotectin, Fecal 12/11/2023 14  0 - 120 ug/g Final    Toxigenic C. difficile by PCR 12/11/2023 Not Detected  Not Detected Final    Campylobacter 12/11/2023 Not Detected  Not Detected Final    Plesiomonas shigelloides 12/11/2023 Not Detected  Not Detected Final    Salmonella 12/11/2023 Not Detected  Not Detected Final    Vibrio 12/11/2023 Not Detected  Not Detected Final    Vibrio cholerae 12/11/2023 Not Detected  Not Detected Final    Yersinia enterocolitica 12/11/2023 Not Detected  Not Detected Final    Enteroaggregative E. coli (EAEC) 12/11/2023 Not Detected  Not Detected Final    Enteropathogenic E. coli (EPEC) 12/11/2023 Not Detected  Not Detected Final    Enterotoxigenic E. coli (ETEC) lt/* 12/11/2023 Not Detected  Not Detected Final    Shiga-like toxin-producing E. coli* 12/11/2023 Not Detected  Not Detected Final    Shigella/Enteroinvasive E. coli (E* 12/11/2023 Not Detected  Not Detected Final    Cryptosporidium 12/11/2023 Not Detected  Not Detected Final    Cyclospora cayetanensis 12/11/2023 Not Detected  Not Detected Final    Entamoeba histolytica 12/11/2023 Not Detected  Not Detected Final    Giardia lamblia 12/11/2023 Not Detected  Not Detected Final    Adenovirus F40/41 12/11/2023 Not Detected  Not Detected Final    Astrovirus 12/11/2023 Not Detected  Not Detected Final    Norovirus GI/GII 12/11/2023 Not Detected  Not Detected Final    Rotavirus A 12/11/2023 Not Detected  Not Detected Final    Sapovirus (I, II, IV or V) 12/11/2023 Not Detected  Not Detected Final      Comprehensive Metabolic Panel (06/13/2023 10:03)  CBC (No Diff) (06/13/2023 10:03)  Protime-INR (06/13/2023 10:03)  Iron Profile (06/13/2023 10:03)  Ferritin (06/13/2023 10:03)  BETTINA (06/13/2023 10:03)  Anti-Smooth Muscle Antibody Titer (06/13/2023 10:03)  Mitochondrial Antibodies, M2 (06/13/2023 10:03)  Ceruloplasmin (06/13/2023 10:03)  Hepatitis A Antibody, Total  (06/13/2023 10:03)  Hepatitis B Surf Antibody Quant (06/13/2023 10:03)  Hepatitis B Surface Antigen (06/13/2023 10:03)  Hepatitis B Core Antibody, Total (06/13/2023 10:03)  Hepatitis C Antibody (06/13/2023 10:03)  GREER Fibrosure Plus (06/13/2023 10:03)     COMPREHENSIVE METABOLIC PANEL (04/22/2024 20:37)  CBC with Auto Diff (04/22/2024 20:37)  LIPASE (04/22/2024 20:37)     Colonoscopy dated 11/16/2016 per Dr. Grimaldo  - Scant diverticular change in the left colon.    - Small diminutive colon polyps.    - Internal and small external hemorrhoids.    - No endoscopic evidence of ileitis or colitis was seen.  Random biopsies were obtained from the colon upon withdrawal of the scope.    - Small bowel terminal ileum biopsy reveals preserved villous architecture with no significant histopathologic change.  No parasites noted.  No villous blunting or increased intraepithelial lymphocytes suggestive of celiac disease.  Random colon biopsies revealed colonic mucosa with no significant histopathologic change.  No histologic features of acute colitis, chronic colitis, collagenous colitis or lymphocytic colitis identified.  Rectal polyp biopsy reveals hyperplastic polyp.     Colonoscopy 8/31/2022 per Dr. Reyna Montenegro, surgical services  - The ileocecal valve and appendiceal orifice were identified. There was no   Sign whatsoever of any polyps, tumors, or inflammatory bowel disease. I did not  see any diverticula. We did do random biopsies of the right and left colon for microscopic colitis as she had noted that her diarrhea was increasing. She as well had two internal hemorrhoids. The patient tolerated the procedure well and was returned to the recovery room in good condition.   -Right colon biopsy with no significant histopathologic abnormality.  Left colon biopsy with no significant histopathologic abnormality.     Abdominal ultrasound limited 2/2/2023  Fatty infiltration of the liver.   The gallbladder is not  identified.    EGD dated 8/15/2023 per Dr. Fiore  - Normal oropharynx.  - Z-line regular, 36 cm from the incisors.  - No gross lesions in the entire esophagus.  - Mild Erythematous mucosa in the antrum and prepyloric region of the stomach. Biopsied.  - A few gastric polyps. One resected and retrieved.  - Normal duodenal bulb, first portion of the duodenum, second portion of the duodenum and third portion of the duodenum. Biopsied.  A.     DUODENUM, BIOPSY:  Duodenal type mucosa with no significant histopathologic abnormalities  Negative for Celiac disease, metaplasia, microorganisms or atypia  B.     ANTRUM AND BODY, BIOPSY:  Gastric mucosa without pathologic alterations  Negative for H. pylori, metaplasia or dysplasia  C.     STOMACH, BODY, POLYP:  Fundic gland polyp  Negative for H. pylori, metaplasia , dysplasia or malignancy      CT Abdomen Pelvis With Contrast     Result Date: 4/22/2024  No acute findings in the abdomen or pelvis to account for the patient's symptoms.   The liver enhances homogenously without suspicious focal hepatic lesion.   Prior cholecystectomy. No significant biliary ductal dilatation.     Assessment / Plan      1. Irritable bowel syndrome with diarrhea  2. Functional gastrointestinal disorder  3. Pancreatic insufficiency  4. Diarrhea, unspecified type  5. Nausea  Likely has functional gastrointestinal disorder/IBS-D at baseline with diabetes/diabetes medication (Trulicity and Jardiance) contributing.  Diarrhea reasonably controlled with WelChol.  Nausea reasonably controlled with Zofran.  No history of GI bleeding. Basic labs unremarkable. TSH normal.  Celiac panel normal.  Fecal calprotectin normal.  Stool for C. difficile negative.  Gastrointestinal panel negative.  Pancreatic elastase low at 98. CTAP 4/22/2024 with pancreas and GI tract unremarkable. She tried taking Creon, but felt her made her blood sugar drop and she stopped it. Colonoscopy in 2016 unremarkable, TI and random  colon biopsies unremarkable. Colonoscopy in August 2022 unremarkable, right colon and left colon biopsies unremarkable.  No recent labs or imaging available for review.    Low FODMAP diet - avoid all dairy.   Continue Welchol daily.  May take Imodium as needed for breakthrough diarrhea.   Zofran as needed for nausea.  Advised eat a softer diet 4-5 very small meals throughout the day, avoid large meals.  Labs  Abdominal ultrasound to rule out pancreatobiliary disease.    - US Abdomen Complete; Future  - Alpha-Gal IgE Panel; Future  - Lipase; Future  - Comprehensive Metabolic Panel; Future  - CBC (No Diff); Future  - ondansetron (ZOFRAN) 4 MG tablet; Take 1 tablet by mouth Every 8 (Eight) Hours As Needed for Nausea or Vomiting.  Dispense: 45 tablet; Refill: 2    6. Gastroesophageal reflux disease without esophagitis  Reflux reasonably controlled with omeprazole 40 mg daily.  Continue same for now.  Denies difficulty swallowing. EGD dated 8/15/2023 unremarkable, no Talley's.    Antireflux measures.  Consider reducing to omeprazole 20 mg daily in the future.    7. Metabolic dysfunction-associated steatohepatitis (MASH)  FibroSure 6/13/2023 with moderate GREER N2/S2-S3/F0-F1.   FIB4 score 2.05/indeterminate risk per labs 4/22/2024  8. Fatty (change of) liver, not elsewhere classified  9. Overweight with body mass index (BMI) of 26 to 26.9 in adult  BMI 26.95  She has lost weight since starting Trulicity.  She states her A1c has improved.  This is monitored by PCP. Abdominal ultrasound dated 2/2/2023 with fatty liver noted.  CTAP 4/22/2024 with liver unremarkable. She is negative for hepatitis B and hepatitis C. PT/INR normal. Iron panel normal. Ferritin normal. BETTINA negative. Smooth muscle antibody normal. Mitochondrial antibody normal. Ceruloplasmin normal. FibroSure 6/13/2023 with moderate GREER N2/S2-S3/F0-F1. FIB4 score 2.05/indeterminate risk per labs 4/22/2024.  No recent labs available for review.    Recommend  low-fat diet, exercise and maintaining ideal body weight.  May need to be started on fenofibrate in addition to her Pravachol if triglycerides continue to be elevated. Lipid panel is monitored through PCP office.  Labs  Abdominal ultrasound    - GREER Fibrosure Plus; Future  - Protime-INR; Future  - Comprehensive Metabolic Panel; Future  - CBC (No Diff); Future    10. Encounter for screening for malignant neoplasm of colon  Colonoscopy in August 2022 by Dr. Montenegro in [facility] with no polyps removed.  Left and right colon biopsies unremarkable.  She was recommended colonoscopy in 5 years at that time.  Colonoscopy in August 2027 or sooner if needed.    Patient Instructions   Antireflux measures: Avoid fried, fatty foods, alcohol, chocolate, coffee, tea,  soft drinks, peppermint and spearmint, spicy foods, tomatoes and tomato based foods, onion based foods, and smoking.  Other antireflux measures include weight reduction if overweight, avoiding tight clothing around the abdomen, elevating the head of the bed 6 inches with blocks under the head board, and don't drink or eat before going to bed and avoid lying down immediately after meals.  Omeprazole 40 mg 1 po daily in the am 30 minutes before breakfast.  Recommended to take Levothyroxine first in the am upon waking, wait 30 minutes, then take Omeprazole 40 mg, wait 30 minutes, then eat breakfast and take other medications.  Zofran 4 mg 1 po every 8 hours as needed for nausea.  The patient should eat 4-5 very small meals throughout the day. Avoid large meals.  It is recommended to eat a softer diet. Meats are best consumed ground. Fruits and vegetables are best consumed cooked or steamed and then mashed.   Low fiber, low fat diet with liberal water intake. May use soluble fiber.  Metamucil 1 packet/scoop daily or gummies/capsules 2-4 per day.   Low FODMAP diet - avoid all dairy. May use lactose free/dairy free alternatives such as almond milk, rice milk, oat  milk, etc.   Continue Welcol tablets daily for diarrhea.   May take Imodium as needed for breakthrough diarrhea.   Bentyl 10 mg 1 po 3 times a day as needed for abdominal cramping.   Advised to exercise 30 minutes 4-5 days per week.   Advised to maintain ideal body weight.  Colonoscopy for screening in 2027 or sooner if needed.   Labs  Abdominal ultrasound  Follow up: 6 months       Low-FODMAP Eating Plan    FODMAP stands for fermentable oligosaccharides, disaccharides, monosaccharides, and polyols. These are sugars that are hard for some people to digest. A low-FODMAP eating plan may help some people who have irritable bowel syndrome (IBS) and certain other bowel (intestinal) diseases to manage their symptoms.  This meal plan can be complicated to follow. Work with a diet and nutrition specialist (dietitian) to make a low-FODMAP eating plan that is right for you. A dietitian can help make sure that you get enough nutrition from this diet.  What are tips for following this plan?  Reading food labels  Check labels for hidden FODMAPs such as:  High-fructose syrup.  Honey.  Agave.  Natural fruit flavors.  Onion or garlic powder.  Choose low-FODMAP foods that contain 3-4 grams of fiber per serving.  Check food labels for serving sizes. Eat only one serving at a time to make sure FODMAP levels stay low.  Shopping  Shop with a list of foods that are recommended on this diet and make a meal plan.  Meal planning  Follow a low-FODMAP eating plan for up to 6 weeks, or as told by your health care provider or dietitian.  To follow the eating plan:  Eliminate high-FODMAP foods from your diet completely. Choose only low-FODMAP foods to eat. You will do this for 2-6 weeks.  Gradually reintroduce high-FODMAP foods into your diet one at a time. Most people should wait a few days before introducing the next new high-FODMAP food into their meal plan. Your dietitian can recommend how quickly you may reintroduce foods.  Keep a daily  record of what and how much you eat and drink. Make note of any symptoms that you have after eating.  Review your daily record with a dietitian regularly to identify which foods you can eat and which foods you should avoid.  General tips  Drink enough fluid each day to keep your urine pale yellow.  Avoid processed foods. These often have added sugar and may be high in FODMAPs.  Avoid most dairy products, whole grains, and sweeteners.  Work with a dietitian to make sure you get enough fiber in your diet.  Avoid high FODMAP foods at meals to manage symptoms.     Recommended foods  Fruits  Bananas, oranges, tangerines, jamison, limes, blueberries, raspberries, strawberries, grapes, cantaloupe, honeydew melon, kiwi, papaya, passion fruit, and pineapple. Limited amounts of dried cranberries, banana chips, and shredded coconut.  Vegetables  Eggplant, zucchini, cucumber, peppers, green beans, bean sprouts, lettuce, arugula, kale, Swiss chard, spinach, maynor greens, bok fabiola, summer squash, potato, and tomato. Limited amounts of corn, carrot, and sweet potato. Green parts of scallions.  Grains  Gluten-free grains, such as rice, oats, buckwheat, quinoa, corn, polenta, and millet. Gluten-free pasta, bread, or cereal. Rice noodles. Corn tortillas.  Meats and other proteins  Unseasoned beef, pork, poultry, or fish. Eggs. [place]in. Tofu (firm) and tempeh. Limited amounts of nuts and seeds, such as almonds, walnuts, brazil nuts, pecans, peanuts, nut butters, pumpkin seeds, gamaliel seeds, and sunflower seeds.  Dairy  Lactose-free milk, yogurt, and kefir. Lactose-free cottage cheese and ice cream. Non-dairy milks, such as almond, coconut, hemp, and rice milk. Non-dairy yogurt. Limited amounts of goat cheese, brie, mozzarella, parmesan, swiss, and other hard cheeses.  Fats and oils  Butter-free spreads. Vegetable oils, such as olive, canola, and sunflower oil.  Seasoning and other foods  Artificial sweeteners with names that do not end  "in \"ol,\" such as aspartame, saccharine, and stevia. Maple syrup, white table sugar, raw sugar, brown sugar, and molasses. Mayonnaise, soy sauce, and tamari. Fresh basil, coriander, parsley, rosemary, and thyme.  Beverages  Water and mineral water. Sugar-sweetened soft drinks. Small amounts of orange juice or cranberry juice. Black and green tea. Most dry faby. Coffee.  The items listed above may not be a complete list of foods and beverages you can eat. Contact a dietitian for more information.     Foods to avoid  Fruits  Fresh, dried, and juiced forms of apple, pear, watermelon, peach, plum, cherries, apricots, blackberries, boysenberries, figs, nectarines, and zuleyma. Avocado.  Vegetables  Chicory root, artichoke, asparagus, cabbage, snow peas, [place] sprouts, broccoli, sugar snap peas, mushrooms, celery, and cauliflower. Onions, garlic, leeks, and the white part of scallions.  Grains  Wheat, including kamut, durum, and semolina. Barley and bulgur. Couscous. Wheat-based cereals. Wheat noodles, bread, crackers, and pastries.  Meats and other proteins  Fried or fatty meat. Sausage. Cashews and pistachios. Soybeans, baked beans, black beans, chickpeas, kidney beans, fabiola beans, navy beans, lentils, black-eyed peas, and split peas.  Dairy  Milk, yogurt, ice cream, and soft cheese. Cream and sour cream. Milk-based sauces. Custard. Buttermilk. Soy milk.  Seasoning and other foods  Any sugar-free gum or candy. Foods that contain artificial sweeteners such as sorbitol, mannitol, isomalt, or xylitol. Foods that contain honey, high-fructose corn syrup, or agave. Bouillon, vegetable stock, beef stock, and chicken stock. Garlic and onion powder. Condiments made with onion, such as hummus, chutney, pickles, relish, salad dressing, and salsa. Tomato paste.  Beverages  Chicory-based drinks. Coffee substitutes. Chamomile tea. Fennel tea. Sweet or fortified faby such as port or lu. Diet soft drinks made with isomalt, " mannitol, maltitol, sorbitol, or xylitol. Apple, pear, and zuleyma juice. Juices with high-fructose corn syrup.  The items listed above may not be a complete list of foods and beverages you should avoid. Contact a dietitian for more information.     Summary  FODMAP stands for fermentable oligosaccharides, disaccharides, monosaccharides, and polyols. These are sugars that are hard for some people to digest.  A low-FODMAP eating plan is a short-term diet that helps to ease symptoms of certain bowel diseases.  The eating plan usually lasts up to 6 weeks. After that, high-FODMAP foods are reintroduced gradually and one at a time. This can help you find out which foods may be causing symptoms.  A low-FODMAP eating plan can be complicated. It is best to work with a dietitian who has experience with this type of plan.  This information is not intended to replace advice given to you by your health care provider. Make sure you discuss any questions you have with your health care provider.  Document Revised: 05/06/2021 Document Reviewed: 05/06/2021  [employer] Patient Education © 2023 [employer]     NORY Millan  5/20/2025    Please note that portions of this note were completed with a voice recognition program.     Patient or patient representative verbalized consent for the use of Ambient Listening during the visit with  NORY Millan for chart documentation. 5/20/2025  16:55 EDT

## (undated) DEVICE — ENDOSCOPY PORT CONNECTOR FOR OLYMPUS® SCOPES: Brand: ERBE

## (undated) DEVICE — CONMED SCOPE SAVER BITE BLOCK, 20X27 MM: Brand: SCOPE SAVER

## (undated) DEVICE — FRCP BX RADJAW4 NDL 2.8 240 STD OG

## (undated) DEVICE — HYBRID TUBING/CAP SET FOR OLYMPUS® SCOPES: Brand: ERBE

## (undated) DEVICE — Device

## (undated) DEVICE — VLV SXN AIR/H2O ORCAPOD3 1P/U STRL